# Patient Record
Sex: FEMALE | Race: OTHER | HISPANIC OR LATINO | URBAN - METROPOLITAN AREA
[De-identification: names, ages, dates, MRNs, and addresses within clinical notes are randomized per-mention and may not be internally consistent; named-entity substitution may affect disease eponyms.]

---

## 2020-10-15 ENCOUNTER — INPATIENT (INPATIENT)
Facility: HOSPITAL | Age: 50
LOS: 5 days | Discharge: HOME | DRG: 177 | End: 2020-10-21
Attending: INTERNAL MEDICINE | Admitting: INTERNAL MEDICINE
Payer: COMMERCIAL

## 2020-10-15 VITALS
HEART RATE: 92 BPM | DIASTOLIC BLOOD PRESSURE: 67 MMHG | WEIGHT: 179.9 LBS | SYSTOLIC BLOOD PRESSURE: 104 MMHG | OXYGEN SATURATION: 91 % | RESPIRATION RATE: 25 BRPM | TEMPERATURE: 99 F

## 2020-10-15 DIAGNOSIS — J18.9 PNEUMONIA, UNSPECIFIED ORGANISM: ICD-10-CM

## 2020-10-15 LAB
ALBUMIN SERPL ELPH-MCNC: 3.1 G/DL — LOW (ref 3.5–5)
ALP SERPL-CCNC: 62 U/L — SIGNIFICANT CHANGE UP (ref 40–120)
ALT FLD-CCNC: 26 U/L DA — SIGNIFICANT CHANGE UP (ref 10–60)
ANION GAP SERPL CALC-SCNC: 3 MMOL/L — LOW (ref 5–17)
AST SERPL-CCNC: 31 U/L — SIGNIFICANT CHANGE UP (ref 10–40)
BASOPHILS # BLD AUTO: 0.01 K/UL — SIGNIFICANT CHANGE UP (ref 0–0.2)
BASOPHILS NFR BLD AUTO: 0.2 % — SIGNIFICANT CHANGE UP (ref 0–2)
BILIRUB SERPL-MCNC: 0.3 MG/DL — SIGNIFICANT CHANGE UP (ref 0.2–1.2)
BUN SERPL-MCNC: 12 MG/DL — SIGNIFICANT CHANGE UP (ref 7–18)
CALCIUM SERPL-MCNC: 8.6 MG/DL — SIGNIFICANT CHANGE UP (ref 8.4–10.5)
CHLORIDE SERPL-SCNC: 104 MMOL/L — SIGNIFICANT CHANGE UP (ref 96–108)
CO2 SERPL-SCNC: 29 MMOL/L — SIGNIFICANT CHANGE UP (ref 22–31)
CREAT SERPL-MCNC: 0.91 MG/DL — SIGNIFICANT CHANGE UP (ref 0.5–1.3)
D DIMER BLD IA.RAPID-MCNC: 155 NG/ML DDU — SIGNIFICANT CHANGE UP
EOSINOPHIL # BLD AUTO: 0.1 K/UL — SIGNIFICANT CHANGE UP (ref 0–0.5)
EOSINOPHIL NFR BLD AUTO: 1.5 % — SIGNIFICANT CHANGE UP (ref 0–6)
GLUCOSE SERPL-MCNC: 104 MG/DL — HIGH (ref 70–99)
HCG SERPL-ACNC: 2 MIU/ML — SIGNIFICANT CHANGE UP
HCT VFR BLD CALC: 37.6 % — SIGNIFICANT CHANGE UP (ref 34.5–45)
HGB BLD-MCNC: 12.8 G/DL — SIGNIFICANT CHANGE UP (ref 11.5–15.5)
IMM GRANULOCYTES NFR BLD AUTO: 0.3 % — SIGNIFICANT CHANGE UP (ref 0–1.5)
LYMPHOCYTES # BLD AUTO: 1.27 K/UL — SIGNIFICANT CHANGE UP (ref 1–3.3)
LYMPHOCYTES # BLD AUTO: 19.4 % — SIGNIFICANT CHANGE UP (ref 13–44)
MCHC RBC-ENTMCNC: 27.6 PG — SIGNIFICANT CHANGE UP (ref 27–34)
MCHC RBC-ENTMCNC: 34 GM/DL — SIGNIFICANT CHANGE UP (ref 32–36)
MCV RBC AUTO: 81 FL — SIGNIFICANT CHANGE UP (ref 80–100)
MONOCYTES # BLD AUTO: 0.3 K/UL — SIGNIFICANT CHANGE UP (ref 0–0.9)
MONOCYTES NFR BLD AUTO: 4.6 % — SIGNIFICANT CHANGE UP (ref 2–14)
NEUTROPHILS # BLD AUTO: 4.85 K/UL — SIGNIFICANT CHANGE UP (ref 1.8–7.4)
NEUTROPHILS NFR BLD AUTO: 74 % — SIGNIFICANT CHANGE UP (ref 43–77)
NRBC # BLD: 0 /100 WBCS — SIGNIFICANT CHANGE UP (ref 0–0)
PLATELET # BLD AUTO: 167 K/UL — SIGNIFICANT CHANGE UP (ref 150–400)
POTASSIUM SERPL-MCNC: 4.7 MMOL/L — SIGNIFICANT CHANGE UP (ref 3.5–5.3)
POTASSIUM SERPL-SCNC: 4.7 MMOL/L — SIGNIFICANT CHANGE UP (ref 3.5–5.3)
PROT SERPL-MCNC: 7.8 G/DL — SIGNIFICANT CHANGE UP (ref 6–8.3)
RBC # BLD: 4.64 M/UL — SIGNIFICANT CHANGE UP (ref 3.8–5.2)
RBC # FLD: 14.1 % — SIGNIFICANT CHANGE UP (ref 10.3–14.5)
SARS-COV-2 RNA SPEC QL NAA+PROBE: DETECTED
SODIUM SERPL-SCNC: 136 MMOL/L — SIGNIFICANT CHANGE UP (ref 135–145)
TROPONIN I SERPL-MCNC: <0.015 NG/ML — SIGNIFICANT CHANGE UP (ref 0–0.04)
WBC # BLD: 6.55 K/UL — SIGNIFICANT CHANGE UP (ref 3.8–10.5)
WBC # FLD AUTO: 6.55 K/UL — SIGNIFICANT CHANGE UP (ref 3.8–10.5)

## 2020-10-15 PROCEDURE — 93010 ELECTROCARDIOGRAM REPORT: CPT

## 2020-10-15 PROCEDURE — 71045 X-RAY EXAM CHEST 1 VIEW: CPT | Mod: 26

## 2020-10-15 PROCEDURE — 99283 EMERGENCY DEPT VISIT LOW MDM: CPT

## 2020-10-15 PROCEDURE — 99222 1ST HOSP IP/OBS MODERATE 55: CPT

## 2020-10-15 NOTE — H&P ADULT - PROBLEM SELECTOR PLAN 2
- as above  will start Remdesivir (200 mg loading dose and c/w 100 mg dq for4 days) after ID approval    NPPV to maintain SaO2 >90% if necessary  -Isolation Precautions   - will consider starting dexamethasone 6mg qd for 10 days if sx progress

## 2020-10-15 NOTE — ED PROVIDER NOTE - CLINICAL SUMMARY MEDICAL DECISION MAKING FREE TEXT BOX
51 y/o woman, no significant PMH, c/o cough, fever, shortness of breath, decreased appetite, mild chest discomfort, high clinical suspicion of COVID and hypoxic upon arrival--CXR, labs, COVID swab, EKG, anticipate admission.

## 2020-10-15 NOTE — ED PROVIDER NOTE - PROGRESS NOTE DETAILS
High suspicion of COVID with swab still pending, but Pt with relative hypoxia (91% on RA), improved on nasal cannula 2 liters.  CXR with b/l infiltrates.  Dr. Sargent accepts admission.

## 2020-10-15 NOTE — ED ADULT NURSE NOTE - OBJECTIVE STATEMENT
pt is here c/o cough - greenish to yellow sputum , fevers of 100.4-100.8 @ nights since saturday - lying in stretcher hob elevated , placed on 100 nrb oxygen , pt is able to complete full sentences without a lot of distress

## 2020-10-15 NOTE — ED PROVIDER NOTE - OBJECTIVE STATEMENT
49 y/o woman, no PMH c/o chills x 5 days, then gradually develooped decreased appetite, and in past day fever to 100.8 F, shortness of breath, chest discomfort.  She went to her PMD Dr. Marie and she says she was given "penicillin shot" and then started on Z-micah and just completed it.  No h/o DVT/PE/heart disease/lung disease.  Never smoked.

## 2020-10-15 NOTE — H&P ADULT - ASSESSMENT
49yo F from home unemployed lives with her mom with no significant PMH presented with cough and SOB . Onset of symptoms was almost 14 days ago started with mild headache , chills associated with poor appetite. Admitted for PNA due to COVID-19.

## 2020-10-15 NOTE — ED PROVIDER NOTE - CARE PLAN
Principal Discharge DX:	Pneumonia of both lower lobes due to infectious organism  Secondary Diagnosis:	Hypoxia

## 2020-10-15 NOTE — H&P ADULT - PROBLEM SELECTOR PLAN 1
p/w SOB, cough, fever, RR:25, [id number] on RA 90%, saturated high 80s on walking , COVID- 19 PCR positive    Shortness of Breath due to COVID-19 Infection   - WBC: WNL, lymphocyte; WNl   - CXR : BL pulmonary infiltration   - Contact and airborne isolation precaution   - LDH, Procalcitonin, ferritin   - continue with oxygen supplementation   - Tylenol, Albuterol Inhaler, Robitussin PRN

## 2020-10-15 NOTE — H&P ADULT - ATTENDING COMMENTS
Patient is a 50 year old female with no significant PMH who presented with a chief complaint of cough.  Patient states that beginning approximately 14 days prior to presentation, she began to experience progressively worsening constitutional symptoms, involving a cough, subjective fever/chills, and decreased appetite.  As a result, approximately 1 week prior to presentation, she was seen by her PCP who prescribed an antibiotic (possibly Zithromax), which patient endorses completion of antibiotic course.    Patient states that she seldom leaves her house due to being currently unemployed, denies any sick contacts, though patient's mother is presenting with similar (though significantly less severe symptoms).    T(C): 37 (10-15-20 @ 18:23), Max: 37 (10-15-20 @ 18:23)  T(F): 98.6 (10-15-20 @ 18:23), Max: 98.6 (10-15-20 @ 18:23)  HR: 92 (10-15-20 @ 18:23) (92 - 92)  BP: 104/67 (10-15-20 @ 18:23) (104/67 - 104/67)  RR: 25 (10-15-20 @ 18:23) (25 - 25)  SpO2: 97% (10-15-20 @ 22:00) (91% - 97%)  Wt(kg): --    P/E: As above MAR    A/P:    Shortness of Breath due to COVID-19 Infection:  -At time of examination, patient clinically appears in moderate distress though without hypoxia and/or requiring supplemental oxygen (Nonsevere Disease)  -Therefore, will start patient on Remdesivir 200mg as a loading dose (Day 1), followed by 100mg once daily for 4 days or until hospital discharge, whichever is first  -Will continue to use NPPV to maintain SaO2 >90%  -If patient develops worsening clinically symptoms (Severe Disease), develops hypoxia and/or requires oxygen, will consider starting patient on dexamethasone 6mg daily for 10 days or until discharge, whichever is shorter  -Will start patient on therapeutic Lovenox  -Patient should be evaluated by Infectious Disease (ID)  -Isolation Precautions  -Will continue to use NPPV to maintain SaO2 >90%    Hypoalbuminemia:  -Nutrition Consult    GI/DVT PPx:  -Pepcid  -Lovenox Patient is a 50 year old female with no significant PMH who presented with a chief complaint of cough.  Patient states that beginning approximately 14 days prior to presentation, she began to experience progressively worsening constitutional symptoms, involving a cough, subjective fever/chills, and decreased appetite.  As a result, approximately 1 week prior to presentation, she was seen by her PCP who prescribed an antibiotic (possibly Zithromax), which patient endorses completion of antibiotic course.    Patient states that she seldom leaves her house due to being currently unemployed, denies any sick contacts, though patient's mother is presenting with similar (though significantly less severe symptoms).    At time of examination, patient required no supplemental oxygen to maintain adequate SaO2 and able to speak in nearly complete sentences, though was frequently interrupted from a harsh, non-productive cough    T(C): 37 (10-15-20 @ 18:23), Max: 37 (10-15-20 @ 18:23)  T(F): 98.6 (10-15-20 @ 18:23), Max: 98.6 (10-15-20 @ 18:23)  HR: 92 (10-15-20 @ 18:23) (92 - 92)  BP: 104/67 (10-15-20 @ 18:23) (104/67 - 104/67)  RR: 25 (10-15-20 @ 18:23) (25 - 25)  SpO2: 97% (10-15-20 @ 22:00) (91% - 97%)  Wt(kg): --    P/E: As above MAR    A/P:    Shortness of Breath due to COVID-19 Infection:  -At time of examination, patient clinically appears in moderate distress though without hypoxia and/or requiring supplemental oxygen (Nonsevere Disease)  -Therefore, will start patient on Remdesivir 200mg as a loading dose (Day 1), followed by 100mg once daily for 4 days or until hospital discharge, whichever is first  -Will continue to use NPPV to maintain SaO2 >90%  -If patient develops worsening clinically symptoms (Severe Disease), develops hypoxia and/or requires oxygen, will consider starting patient on dexamethasone 6mg daily for 10 days or until discharge, whichever is shorter  -Will start patient on therapeutic Lovenox  -Patient should be evaluated by Infectious Disease (ID)  -Isolation Precautions  -Will continue to use NPPV to maintain SaO2 >90%    Hypoalbuminemia:  -Nutrition Consult    GI/DVT PPx:  -Pepcid  -Lovenox Patient is a 50 year old female with no significant PMH who presented with a chief complaint of cough.  Patient states that beginning approximately 14 days prior to presentation, she began to experience progressively worsening constitutional symptoms, involving a cough, subjective fever/chills, and decreased appetite.  As a result, approximately 1 week prior to presentation, she was seen by her PCP who prescribed an antibiotic (possibly Zithromax), which patient endorses completion of antibiotic course.    Patient states that she seldom leaves her house due to being currently unemployed, denies any sick contacts, though patient's mother is presenting with similar (though significantly less severe symptoms).    At time of examination, patient required no supplemental oxygen to maintain adequate SaO2 and able to speak in nearly complete sentences, though was frequently interrupted from a harsh, non-productive cough    T(C): 37 (10-15-20 @ 18:23), Max: 37 (10-15-20 @ 18:23)  T(F): 98.6 (10-15-20 @ 18:23), Max: 98.6 (10-15-20 @ 18:23)  HR: 92 (10-15-20 @ 18:23) (92 - 92)  BP: 104/67 (10-15-20 @ 18:23) (104/67 - 104/67)  RR: 25 (10-15-20 @ 18:23) (25 - 25)  SpO2: 97% (10-15-20 @ 22:00) (91% - 97%)  Wt(kg): --    P/E: As above MAR    A/P:    Shortness of Breath due to COVID-19 Infection:  -At time of examination, patient clinically appears in moderate distress though without hypoxia and/or requiring supplemental oxygen (Nonsevere Disease)  -Therefore, will start patient on Remdesivir 200mg as a loading dose (Day 1), followed by 100mg once daily for 4 days or until hospital discharge, whichever is first  -Should use NPPV to maintain SaO2 >90% if necessary  -If patient develops worsening clinically symptoms (Severe Disease), develops hypoxia and/or requires oxygen, will consider starting patient on dexamethasone 6mg daily for 10 days or until discharge, whichever is shorter  -Will start patient on therapeutic Lovenox  -Patient should be evaluated by Infectious Disease (ID)  -Isolation Precautions  -Will continue to use NPPV to maintain SaO2 >90%    Hypoalbuminemia:  -Nutrition Consult    GI/DVT PPx:  -Pepcid  -Lovenox

## 2020-10-15 NOTE — ED PROVIDER NOTE - RESPIRATORY, MLM
Breath sounds clear and equal bilaterally.  Pt is short of breath with speaking but able to speak in full sentences.

## 2020-10-15 NOTE — H&P ADULT - NSHPPHYSICALEXAM_GEN_ALL_CORE
CONSTITUTIONAL: in mild respiratory distress  ENMT: Airway patent, Nasal mucosa clear. Mouth with normal mucosa. Throat has no vesicles, no oropharyngeal exudates and uvula is midline.  EYES: Clear bilaterally, pupils equal, round and reactive to light. EOMI.  CARDIAC: Normal rate, regular rhythm.  Heart sounds S1, S2.  No murmurs, rubs or gallops   RESPIRATORY: Breath sounds clear and equal bilaterally. No wheezes, rhales or rhonchi  MUSCULOSKELETAL: Spine appears normal, range of motion is not limited, no muscle or joint tenderness  EXTREMITIES: No edema, cyanosis or deformity   NEUROLOGICAL: Alert and oriented, no focal deficits, no motor or sensory deficits.  SKIN: No rash, skin turgor    Abdomen : NT , Soft , ND

## 2020-10-16 DIAGNOSIS — Z29.9 ENCOUNTER FOR PROPHYLACTIC MEASURES, UNSPECIFIED: ICD-10-CM

## 2020-10-16 DIAGNOSIS — U07.1 COVID-19: ICD-10-CM

## 2020-10-16 DIAGNOSIS — R06.03 ACUTE RESPIRATORY DISTRESS: ICD-10-CM

## 2020-10-16 LAB
ALBUMIN SERPL ELPH-MCNC: 3.7 G/DL — SIGNIFICANT CHANGE UP (ref 3.3–5)
ALP SERPL-CCNC: 57 U/L — SIGNIFICANT CHANGE UP (ref 40–120)
ALT FLD-CCNC: 19 U/L — SIGNIFICANT CHANGE UP (ref 10–45)
AST SERPL-CCNC: 28 U/L — SIGNIFICANT CHANGE UP (ref 10–40)
BILIRUB DIRECT SERPL-MCNC: 0.1 MG/DL — SIGNIFICANT CHANGE UP (ref 0–0.2)
BILIRUB INDIRECT FLD-MCNC: 0.1 MG/DL — LOW (ref 0.2–1.2)
BILIRUB SERPL-MCNC: 0.2 MG/DL — SIGNIFICANT CHANGE UP (ref 0.2–1.2)
CREAT SERPL-MCNC: 0.86 MG/DL — SIGNIFICANT CHANGE UP (ref 0.5–1.3)
CRP SERPL-MCNC: 3.69 MG/DL — HIGH (ref 0–0.4)
FERRITIN SERPL-MCNC: 413 NG/ML — HIGH (ref 15–150)
IGA FLD-MCNC: 251 MG/DL — SIGNIFICANT CHANGE UP (ref 84–499)
IGG FLD-MCNC: 1231 MG/DL — SIGNIFICANT CHANGE UP (ref 610–1660)
IGM SERPL-MCNC: 60 MG/DL — SIGNIFICANT CHANGE UP (ref 35–242)
KAPPA LC SER QL IFE: 3.28 MG/DL — HIGH (ref 0.33–1.94)
KAPPA/LAMBDA FREE LIGHT CHAIN RATIO, SERUM: 1.08 RATIO — SIGNIFICANT CHANGE UP (ref 0.26–1.65)
LAMBDA LC SER QL IFE: 3.03 MG/DL — HIGH (ref 0.57–2.63)
LDH SERPL L TO P-CCNC: 296 U/L — HIGH (ref 120–225)
NT-PROBNP SERPL-SCNC: 5 PG/ML — SIGNIFICANT CHANGE UP (ref 0–125)
PROT SERPL-MCNC: 6.6 G/DL — SIGNIFICANT CHANGE UP (ref 6–8.3)
SARS-COV-2 IGG SERPL QL IA: NEGATIVE — SIGNIFICANT CHANGE UP
SARS-COV-2 IGM SERPL IA-ACNC: 4.23 AU/ML — SIGNIFICANT CHANGE UP

## 2020-10-16 PROCEDURE — 99233 SBSQ HOSP IP/OBS HIGH 50: CPT | Mod: GC

## 2020-10-16 RX ORDER — ENOXAPARIN SODIUM 100 MG/ML
40 INJECTION SUBCUTANEOUS DAILY
Refills: 0 | Status: DISCONTINUED | OUTPATIENT
Start: 2020-10-16 | End: 2020-10-21

## 2020-10-16 RX ORDER — PANTOPRAZOLE SODIUM 20 MG/1
40 TABLET, DELAYED RELEASE ORAL
Refills: 0 | Status: DISCONTINUED | OUTPATIENT
Start: 2020-10-16 | End: 2020-10-21

## 2020-10-16 RX ORDER — ALBUTEROL 90 UG/1
2 AEROSOL, METERED ORAL EVERY 6 HOURS
Refills: 0 | Status: COMPLETED | OUTPATIENT
Start: 2020-10-16 | End: 2020-10-16

## 2020-10-16 RX ORDER — SODIUM CHLORIDE 9 MG/ML
500 INJECTION INTRAMUSCULAR; INTRAVENOUS; SUBCUTANEOUS ONCE
Refills: 0 | Status: COMPLETED | OUTPATIENT
Start: 2020-10-16 | End: 2020-10-16

## 2020-10-16 RX ORDER — REMDESIVIR 5 MG/ML
200 INJECTION INTRAVENOUS EVERY 24 HOURS
Refills: 0 | Status: COMPLETED | OUTPATIENT
Start: 2020-10-16 | End: 2020-10-16

## 2020-10-16 RX ORDER — REMDESIVIR 5 MG/ML
INJECTION INTRAVENOUS
Refills: 0 | Status: COMPLETED | OUTPATIENT
Start: 2020-10-16 | End: 2020-10-20

## 2020-10-16 RX ORDER — REMDESIVIR 5 MG/ML
100 INJECTION INTRAVENOUS EVERY 24 HOURS
Refills: 0 | Status: COMPLETED | OUTPATIENT
Start: 2020-10-17 | End: 2020-10-20

## 2020-10-16 RX ORDER — ACETAMINOPHEN 500 MG
650 TABLET ORAL EVERY 6 HOURS
Refills: 0 | Status: DISCONTINUED | OUTPATIENT
Start: 2020-10-16 | End: 2020-10-21

## 2020-10-16 RX ORDER — DEXAMETHASONE 0.5 MG/5ML
6 ELIXIR ORAL DAILY
Refills: 0 | Status: DISCONTINUED | OUTPATIENT
Start: 2020-10-16 | End: 2020-10-21

## 2020-10-16 RX ADMIN — ALBUTEROL 2 PUFF(S): 90 AEROSOL, METERED ORAL at 05:10

## 2020-10-16 RX ADMIN — Medication 650 MILLIGRAM(S): at 02:23

## 2020-10-16 RX ADMIN — ENOXAPARIN SODIUM 40 MILLIGRAM(S): 100 INJECTION SUBCUTANEOUS at 12:50

## 2020-10-16 RX ADMIN — SODIUM CHLORIDE 500 MILLILITER(S): 9 INJECTION INTRAMUSCULAR; INTRAVENOUS; SUBCUTANEOUS at 08:18

## 2020-10-16 RX ADMIN — Medication 650 MILLIGRAM(S): at 01:30

## 2020-10-16 RX ADMIN — PANTOPRAZOLE SODIUM 40 MILLIGRAM(S): 20 TABLET, DELAYED RELEASE ORAL at 05:12

## 2020-10-16 RX ADMIN — REMDESIVIR 500 MILLIGRAM(S): 5 INJECTION INTRAVENOUS at 14:57

## 2020-10-16 RX ADMIN — Medication 6 MILLIGRAM(S): at 12:50

## 2020-10-16 NOTE — PROGRESS NOTE ADULT - SUBJECTIVE AND OBJECTIVE BOX
PGY-1 Progress Note discussed with attending    PAGER #: [-----] TILL 5:00 PM  PLEASE CONTACT ON CALL TEAM:  - On Call Team (Please refer to [place]) FROM 5:00 PM - 8:30PM  - Nightfloat Team FROM 8:30 -7:30 AM    CHIEF COMPLAINT & BRIEF HOSPITAL COURSE:  51yo F from home unemployed lives with her mom with no significant PMH presented with cough and SOB . Onset of symptoms was almost 14 days ago started with mild headache , chills associated with poor appetite.  patient reports symptoms were progressive with worsening cough , sob for which patient visited PCP who prescribed Z pack and patient completed the course . Patient started to spike fever to Max 100.8 over night , with chest discomfort. patient endorses loose BM , poor oral intake due to poor appetite, denies N/V. Patient is unemployed and barely leaves her house , denies sick contact however mother presented to ED with similiar Sx although much milder. Patient reports symports started with her and mother afterwards.     INTERVAL HPI/OVERNIGHT EVENTS: Patient is running low blood pressure. but MAP is ok. will observe the patient through all the day.     MEDICATIONS:  acetaminophen   Tablet .. 650 milliGRAM(s) Oral every 6 hours PRN  dexAMETHasone  Injectable 6 milliGRAM(s) IV Push daily  enoxaparin Injectable 40 milliGRAM(s) SubCutaneous daily  pantoprazole    Tablet 40 milliGRAM(s) Oral before breakfast  remdesivir  IVPB   IV Intermittent       REVIEW OF SYSTEMS:  CONSTITUTIONAL: No fever, weight loss, or fatigue  RESPIRATORY: No cough, wheezing, chills or hemoptysis; No shortness of breath  CARDIOVASCULAR: No chest pain, palpitations, dizziness, or leg swelling  GASTROINTESTINAL: No abdominal pain. No nausea, vomiting, or hematemesis; No diarrhea or constipation. No melena or hematochezia.  GENITOURINARY: No dysuria or hematuria, urinary frequency  NEUROLOGICAL: No headaches, memory loss, loss of strength, numbness, or tremors  SKIN: No itching, burning, rashes, or lesions     Vital Signs Last 24 Hrs  T(C): 37.9 (16 Oct 2020 14:57), Max: 38.9 (16 Oct 2020 01:57)  T(F): 100.2 (16 Oct 2020 14:57), Max: 102.1 (16 Oct 2020 01:57)  HR: 93 (16 Oct 2020 14:57) (77 - 95)  BP: 96/72 (16 Oct 2020 14:57) (95/47 - 104/67)  BP(mean): --  RR: 18 (16 Oct 2020 14:57) (17 - 25)  SpO2: 100% (16 Oct 2020 14:57) (91% - 100%)    PHYSICAL EXAMINATION:  CONSTITUTIONAL: in mild respiratory distress  ENMT: Airway patent, Nasal mucosa clear. Mouth with normal mucosa. Throat has no vesicles, no oropharyngeal exudates and uvula is midline.  EYES: Clear bilaterally, pupils equal, round and reactive to light. EOMI.  CARDIAC: Normal rate, regular rhythm.  Heart sounds S1, S2.  No murmurs, rubs or gallops   RESPIRATORY: Breath sounds clear and equal bilaterally. No wheezes, rhales or rhonchi  MUSCULOSKELETAL: Spine appears normal, range of motion is not limited, no muscle or joint tenderness  EXTREMITIES: No edema, cyanosis or deformity   NEUROLOGICAL: Alert and oriented, no focal deficits, no motor or sensory deficits.  SKIN: No rash, skin turgor   Abdomen : NT , Soft , ND                        12.8   6.55  )-----------( 167      ( 15 Oct 2020 19:48 )             37.6     10-15    136  |  104  |  12  ----------------------------<  104<H>  4.7   |  29  |  0.91    Ca    8.6      15 Oct 2020 19:48    TPro  7.8  /  Alb  3.1<L>  /  TBili  0.3  /  DBili  x   /  AST  31  /  ALT  26  /  AlkPhos  62  10-15    LIVER FUNCTIONS - ( 15 Oct 2020 19:48 )  Alb: 3.1 g/dL / Pro: 7.8 g/dL / ALK PHOS: 62 U/L / ALT: 26 U/L DA / AST: 31 U/L / GGT: x           CARDIAC MARKERS ( 15 Oct 2020 19:48 )  <0.015 ng/mL / x     / x     / x     / x              CAPILLARY BLOOD GLUCOSE      RADIOLOGY & ADDITIONAL TESTS:

## 2020-10-16 NOTE — PROGRESS NOTE ADULT - PROBLEM SELECTOR PLAN 1
-  p/w SOB, cough, fever, RR:25, [id number] on RA 90%, saturated high 80s on    walking , COVID- 19 PCR positive  - COVID-19 antibodies is negative.   -  Shortness of Breath due to COVID-19 Infection   -  WBC: WNL, lymphocyte; WNl   -  CXR : BL pulmonary infiltration   - Contact and airborne isolation precaution   - LDH, Procalcitonin, ferritin EVERY 2 DAYS   - continue with oxygen supplementation   - Tylenol, Albuterol Inhaler, Robitussin PRN

## 2020-10-16 NOTE — PROGRESS NOTE ADULT - PROBLEM SELECTOR PLAN 2
- as above\  -  Remdesivir was started and will follow up LFTs every day.   -  Isolation Precautions  -  will consider starting dexamethasone 6mg qd for 10 days if sx progress -  as above  -  Remdesivir was started and will follow up LFTs every day.   -  Isolation Precautions  -  will consider starting dexamethasone 6mg qd for 10 days if sx progress

## 2020-10-17 LAB
A1C WITH ESTIMATED AVERAGE GLUCOSE RESULT: 6.2 % — HIGH (ref 4–5.6)
ALBUMIN SERPL ELPH-MCNC: 2.6 G/DL — LOW (ref 3.5–5)
ALBUMIN SERPL ELPH-MCNC: 2.7 G/DL — LOW (ref 3.5–5)
ALP SERPL-CCNC: 54 U/L — SIGNIFICANT CHANGE UP (ref 40–120)
ALP SERPL-CCNC: 55 U/L — SIGNIFICANT CHANGE UP (ref 40–120)
ALT FLD-CCNC: 24 U/L DA — SIGNIFICANT CHANGE UP (ref 10–60)
ALT FLD-CCNC: 25 U/L DA — SIGNIFICANT CHANGE UP (ref 10–60)
ANION GAP SERPL CALC-SCNC: 6 MMOL/L — SIGNIFICANT CHANGE UP (ref 5–17)
AST SERPL-CCNC: 29 U/L — SIGNIFICANT CHANGE UP (ref 10–40)
AST SERPL-CCNC: 32 U/L — SIGNIFICANT CHANGE UP (ref 10–40)
BASOPHILS # BLD AUTO: 0.01 K/UL — SIGNIFICANT CHANGE UP (ref 0–0.2)
BASOPHILS NFR BLD AUTO: 0.1 % — SIGNIFICANT CHANGE UP (ref 0–2)
BILIRUB DIRECT SERPL-MCNC: <0.1 MG/DL — SIGNIFICANT CHANGE UP (ref 0–0.2)
BILIRUB INDIRECT FLD-MCNC: >0.1 MG/DL — LOW (ref 0.2–1)
BILIRUB SERPL-MCNC: 0.2 MG/DL — SIGNIFICANT CHANGE UP (ref 0.2–1.2)
BILIRUB SERPL-MCNC: 0.2 MG/DL — SIGNIFICANT CHANGE UP (ref 0.2–1.2)
BUN SERPL-MCNC: 16 MG/DL — SIGNIFICANT CHANGE UP (ref 7–18)
CALCIUM SERPL-MCNC: 8.5 MG/DL — SIGNIFICANT CHANGE UP (ref 8.4–10.5)
CHLORIDE SERPL-SCNC: 108 MMOL/L — SIGNIFICANT CHANGE UP (ref 96–108)
CHOLEST SERPL-MCNC: 122 MG/DL — SIGNIFICANT CHANGE UP (ref 10–199)
CO2 SERPL-SCNC: 27 MMOL/L — SIGNIFICANT CHANGE UP (ref 22–31)
CREAT SERPL-MCNC: 0.62 MG/DL — SIGNIFICANT CHANGE UP (ref 0.5–1.3)
CRP SERPL-MCNC: 7.4 MG/DL — HIGH (ref 0–0.4)
EOSINOPHIL # BLD AUTO: 0 K/UL — SIGNIFICANT CHANGE UP (ref 0–0.5)
EOSINOPHIL NFR BLD AUTO: 0 % — SIGNIFICANT CHANGE UP (ref 0–6)
ESTIMATED AVERAGE GLUCOSE: 131 MG/DL — HIGH (ref 68–114)
FERRITIN SERPL-MCNC: 501 NG/ML — HIGH (ref 15–150)
GLUCOSE SERPL-MCNC: 113 MG/DL — HIGH (ref 70–99)
HCT VFR BLD CALC: 35.5 % — SIGNIFICANT CHANGE UP (ref 34.5–45)
HDLC SERPL-MCNC: 57 MG/DL — SIGNIFICANT CHANGE UP
HGB BLD-MCNC: 12.1 G/DL — SIGNIFICANT CHANGE UP (ref 11.5–15.5)
IMM GRANULOCYTES NFR BLD AUTO: 0.6 % — SIGNIFICANT CHANGE UP (ref 0–1.5)
LDH SERPL L TO P-CCNC: 295 U/L — HIGH (ref 120–225)
LIPID PNL WITH DIRECT LDL SERPL: 54 MG/DL — SIGNIFICANT CHANGE UP
LYMPHOCYTES # BLD AUTO: 1.13 K/UL — SIGNIFICANT CHANGE UP (ref 1–3.3)
LYMPHOCYTES # BLD AUTO: 10.3 % — LOW (ref 13–44)
MCHC RBC-ENTMCNC: 27.7 PG — SIGNIFICANT CHANGE UP (ref 27–34)
MCHC RBC-ENTMCNC: 34.1 GM/DL — SIGNIFICANT CHANGE UP (ref 32–36)
MCV RBC AUTO: 81.2 FL — SIGNIFICANT CHANGE UP (ref 80–100)
MONOCYTES # BLD AUTO: 0.38 K/UL — SIGNIFICANT CHANGE UP (ref 0–0.9)
MONOCYTES NFR BLD AUTO: 3.5 % — SIGNIFICANT CHANGE UP (ref 2–14)
NEUTROPHILS # BLD AUTO: 9.34 K/UL — HIGH (ref 1.8–7.4)
NEUTROPHILS NFR BLD AUTO: 85.5 % — HIGH (ref 43–77)
NRBC # BLD: 0 /100 WBCS — SIGNIFICANT CHANGE UP (ref 0–0)
PLATELET # BLD AUTO: 205 K/UL — SIGNIFICANT CHANGE UP (ref 150–400)
POTASSIUM SERPL-MCNC: 4.4 MMOL/L — SIGNIFICANT CHANGE UP (ref 3.5–5.3)
POTASSIUM SERPL-SCNC: 4.4 MMOL/L — SIGNIFICANT CHANGE UP (ref 3.5–5.3)
PROT SERPL-MCNC: 7.3 G/DL — SIGNIFICANT CHANGE UP (ref 6–8.3)
PROT SERPL-MCNC: 7.4 G/DL — SIGNIFICANT CHANGE UP (ref 6–8.3)
RBC # BLD: 4.37 M/UL — SIGNIFICANT CHANGE UP (ref 3.8–5.2)
RBC # FLD: 14 % — SIGNIFICANT CHANGE UP (ref 10.3–14.5)
SODIUM SERPL-SCNC: 141 MMOL/L — SIGNIFICANT CHANGE UP (ref 135–145)
TOTAL CHOLESTEROL/HDL RATIO MEASUREMENT: 2.1 RATIO — LOW (ref 3.3–7.1)
TRIGL SERPL-MCNC: 55 MG/DL — SIGNIFICANT CHANGE UP (ref 10–149)
TSH SERPL-MCNC: 0.62 UU/ML — SIGNIFICANT CHANGE UP (ref 0.34–4.82)
WBC # BLD: 10.93 K/UL — HIGH (ref 3.8–10.5)
WBC # FLD AUTO: 10.93 K/UL — HIGH (ref 3.8–10.5)

## 2020-10-17 PROCEDURE — 99233 SBSQ HOSP IP/OBS HIGH 50: CPT | Mod: GC

## 2020-10-17 RX ADMIN — Medication 6 MILLIGRAM(S): at 05:24

## 2020-10-17 RX ADMIN — REMDESIVIR 500 MILLIGRAM(S): 5 INJECTION INTRAVENOUS at 14:59

## 2020-10-17 RX ADMIN — PANTOPRAZOLE SODIUM 40 MILLIGRAM(S): 20 TABLET, DELAYED RELEASE ORAL at 05:24

## 2020-10-17 RX ADMIN — ENOXAPARIN SODIUM 40 MILLIGRAM(S): 100 INJECTION SUBCUTANEOUS at 11:24

## 2020-10-17 NOTE — CHART NOTE - NSCHARTNOTEFT_GEN_A_CORE
Patient is a 50y old  Female who presents with a chief complaint of PNA due to Covid (16 Oct 2020 15:00)      INTERVAL HPI/OVERNIGHT EVENTS:  Pt was examined by the bedside. Pt is saturating 99% with NC2L on sitting at 9:40am. Pt states she felt SOB when she went to the bathroom and rush back to her bed to put oxygen on.  On lung exam, pt couldn't take a deep breath as whenever pt tries to take a deep breath, pt has cough. Pt denied headache, calf pain, or chills. Pt states she had loose stool.       REVIEW OF SYSTEMS:  CONSTITUTIONAL: No fever  NECK: No pain or stiffness  RESPIRATORY: (+) cough, no wheezing, chills, (+) shortness of breath with walking  CARDIOVASCULAR: No chest pain,  leg swelling  GASTROINTESTINAL: No abdominal pain.  (+) loose stool.  NEUROLOGICAL: No headaches  SKIN: No itching, burning, rashes, or lesions   MUSCULOSKELETAL: No joint pain or swelling;      FAMILY HISTORY:    Vital Signs Last 24 Hrs  T(C): 37.2 (17 Oct 2020 06:33), Max: 37.9 (16 Oct 2020 14:57)  T(F): 98.9 (17 Oct 2020 06:33), Max: 100.2 (16 Oct 2020 14:57)  HR: 74 (17 Oct 2020 06:33) (74 - 93)  BP: 112/48 (17 Oct 2020 06:33) (96/72 - 117/52)  BP(mean): --  RR: 18 (17 Oct 2020 06:33) (18 - 18)  SpO2: 100% (17 Oct 2020 06:33) (100% - 100%)      PHYSICAL EXAM:  GENERAL: Anxious, well-groomed, well-developed  HEAD:  Atraumatic, Normocephalic  EYES:  conjunctiva and sclera clear  NERVOUS SYSTEM:  Alert & Oriented X3, Good concentration; Moving all extremities  CHEST/LUNG: Cannot listen fully as pt has cough whenever she tries to take a deep breath  HEART: Regular rate and rhythm; No murmurs, rubs, or gallops  ABDOMEN: Soft, Nontender, Nondistended; Bowel sounds present  EXTREMITIES:  No edema      Consultant(s) Notes Reviewed:  [x ] YES  [ ] NO  Care Discussed with Consultants/Other Providers [ x] YES  [ ] NO    LABS:        RADIOLOGY & ADDITIONAL TESTS:    Imaging Personally Reviewed:  [ ] YES  [ ] NO  acetaminophen   Tablet .. 650 milliGRAM(s) Oral every 6 hours PRN  dexAMETHasone  Injectable 6 milliGRAM(s) IV Push daily  enoxaparin Injectable 40 milliGRAM(s) SubCutaneous daily  pantoprazole    Tablet 40 milliGRAM(s) Oral before breakfast  remdesivir  IVPB   IV Intermittent   remdesivir  IVPB 100 milliGRAM(s) IV Intermittent every 24 hours      HEALTH ISSUES - PROBLEM Dx:  Prophylactic measure  Prophylactic measure    Pneumonia due to COVID-19 virus  Pneumonia due to COVID-19 virus    Respiratory distress  Respiratory distress

## 2020-10-17 NOTE — PROGRESS NOTE ADULT - SUBJECTIVE AND OBJECTIVE BOX
Patient is a 50y old  Female who presents with a chief complaint of PNA due to Covid (16 Oct 2020 15:00)    Patient was seen and examined at bedside   SOB and cough has improved     INTERVAL HPI/OVERNIGHT EVENTS:  T(C): 37.1 (10-17-20 @ 14:38), Max: 37.3 (10-16-20 @ 19:15)  HR: 73 (10-17-20 @ 14:38) (73 - 79)  BP: 105/48 (10-17-20 @ 14:38) (105/48 - 117/52)  RR: 20 (10-17-20 @ 14:38) (18 - 20)  SpO2: 96% (10-17-20 @ 14:38) (96% - 100%)  Wt(kg): --  I&O's Summary      REVIEW OF SYSTEMS: denies fever, chills, chest pain, abdominal pain, nausea, vomiting diarrhea, constipation, dizziness    MEDICATIONS  (STANDING):  dexAMETHasone  Injectable 6 milliGRAM(s) IV Push daily  enoxaparin Injectable 40 milliGRAM(s) SubCutaneous daily  pantoprazole    Tablet 40 milliGRAM(s) Oral before breakfast  remdesivir  IVPB   IV Intermittent   remdesivir  IVPB 100 milliGRAM(s) IV Intermittent every 24 hours    MEDICATIONS  (PRN):  acetaminophen   Tablet .. 650 milliGRAM(s) Oral every 6 hours PRN Temp greater or equal to 38C (100.4F), Mild Pain (1 - 3)      PHYSICAL EXAM:  GENERAL: obese, comfortable with 2L NC  NERVOUS SYSTEM:  Alert & Oriented X3, Good concentration; Motor Strength 5/5 B/L upper and lower extremities  CHEST/LUNG: BL coarse breath sound with scattered crepitation  HEART: Regular rate and rhythm; No murmurs, rubs, or gallops  ABDOMEN: Soft, Nontender, Nondistended; Bowel sounds present  EXTREMITIES:  2+ Peripheral Pulses, No clubbing, cyanosis, or edema  SKIN: No rashes or lesions    LABS:                        12.1   10.93 )-----------( 205      ( 17 Oct 2020 07:26 )             35.5     141  |  108  |  16  ----------------------------<  113<H>  4.4   |  27  |  0.62    Ca    8.5      17 Oct 2020 07:26    TPro  7.4  /  Alb  2.6<L>  /  TBili  0.2  /  DBili  <0.1  /  AST  32  /  ALT  24  /  AlkPhos  55  10-17        CAPILLARY BLOOD GLUCOSE

## 2020-10-18 LAB
24R-OH-CALCIDIOL SERPL-MCNC: 26.4 NG/ML — LOW (ref 30–80)
ALBUMIN SERPL ELPH-MCNC: 2.7 G/DL — LOW (ref 3.5–5)
ALP SERPL-CCNC: 53 U/L — SIGNIFICANT CHANGE UP (ref 40–120)
ALT FLD-CCNC: 24 U/L DA — SIGNIFICANT CHANGE UP (ref 10–60)
ANION GAP SERPL CALC-SCNC: 5 MMOL/L — SIGNIFICANT CHANGE UP (ref 5–17)
AST SERPL-CCNC: 38 U/L — SIGNIFICANT CHANGE UP (ref 10–40)
BASOPHILS # BLD AUTO: 0.01 K/UL — SIGNIFICANT CHANGE UP (ref 0–0.2)
BASOPHILS NFR BLD AUTO: 0.1 % — SIGNIFICANT CHANGE UP (ref 0–2)
BILIRUB SERPL-MCNC: 0.3 MG/DL — SIGNIFICANT CHANGE UP (ref 0.2–1.2)
BUN SERPL-MCNC: 24 MG/DL — HIGH (ref 7–18)
CALCIUM SERPL-MCNC: 8.9 MG/DL — SIGNIFICANT CHANGE UP (ref 8.4–10.5)
CHLORIDE SERPL-SCNC: 107 MMOL/L — SIGNIFICANT CHANGE UP (ref 96–108)
CO2 SERPL-SCNC: 27 MMOL/L — SIGNIFICANT CHANGE UP (ref 22–31)
CREAT SERPL-MCNC: 0.69 MG/DL — SIGNIFICANT CHANGE UP (ref 0.5–1.3)
CREAT SERPL-MCNC: 0.69 MG/DL — SIGNIFICANT CHANGE UP (ref 0.5–1.3)
CRP SERPL-MCNC: 4.74 MG/DL — HIGH (ref 0–0.4)
EOSINOPHIL # BLD AUTO: 0 K/UL — SIGNIFICANT CHANGE UP (ref 0–0.5)
EOSINOPHIL NFR BLD AUTO: 0 % — SIGNIFICANT CHANGE UP (ref 0–6)
ERYTHROCYTE [SEDIMENTATION RATE] IN BLOOD: 51 MM/HR — HIGH (ref 0–15)
FERRITIN SERPL-MCNC: 646 NG/ML — HIGH (ref 15–150)
GLUCOSE SERPL-MCNC: 117 MG/DL — HIGH (ref 70–99)
HCT VFR BLD CALC: 36.6 % — SIGNIFICANT CHANGE UP (ref 34.5–45)
HGB BLD-MCNC: 12.4 G/DL — SIGNIFICANT CHANGE UP (ref 11.5–15.5)
IMM GRANULOCYTES NFR BLD AUTO: 1 % — SIGNIFICANT CHANGE UP (ref 0–1.5)
LDH SERPL L TO P-CCNC: 475 U/L — HIGH (ref 120–225)
LYMPHOCYTES # BLD AUTO: 1.65 K/UL — SIGNIFICANT CHANGE UP (ref 1–3.3)
LYMPHOCYTES # BLD AUTO: 20 % — SIGNIFICANT CHANGE UP (ref 13–44)
MCHC RBC-ENTMCNC: 27.6 PG — SIGNIFICANT CHANGE UP (ref 27–34)
MCHC RBC-ENTMCNC: 33.9 GM/DL — SIGNIFICANT CHANGE UP (ref 32–36)
MCV RBC AUTO: 81.5 FL — SIGNIFICANT CHANGE UP (ref 80–100)
MONOCYTES # BLD AUTO: 0.53 K/UL — SIGNIFICANT CHANGE UP (ref 0–0.9)
MONOCYTES NFR BLD AUTO: 6.4 % — SIGNIFICANT CHANGE UP (ref 2–14)
NEUTROPHILS # BLD AUTO: 5.98 K/UL — SIGNIFICANT CHANGE UP (ref 1.8–7.4)
NEUTROPHILS NFR BLD AUTO: 72.5 % — SIGNIFICANT CHANGE UP (ref 43–77)
NRBC # BLD: 0 /100 WBCS — SIGNIFICANT CHANGE UP (ref 0–0)
PLATELET # BLD AUTO: 283 K/UL — SIGNIFICANT CHANGE UP (ref 150–400)
POTASSIUM SERPL-MCNC: 4.9 MMOL/L — SIGNIFICANT CHANGE UP (ref 3.5–5.3)
POTASSIUM SERPL-SCNC: 4.9 MMOL/L — SIGNIFICANT CHANGE UP (ref 3.5–5.3)
PROCALCITONIN SERPL-MCNC: 0.03 NG/ML — SIGNIFICANT CHANGE UP (ref 0.02–0.1)
PROT SERPL-MCNC: 7.7 G/DL — SIGNIFICANT CHANGE UP (ref 6–8.3)
RBC # BLD: 4.49 M/UL — SIGNIFICANT CHANGE UP (ref 3.8–5.2)
RBC # FLD: 14.2 % — SIGNIFICANT CHANGE UP (ref 10.3–14.5)
SODIUM SERPL-SCNC: 139 MMOL/L — SIGNIFICANT CHANGE UP (ref 135–145)
WBC # BLD: 8.25 K/UL — SIGNIFICANT CHANGE UP (ref 3.8–10.5)
WBC # FLD AUTO: 8.25 K/UL — SIGNIFICANT CHANGE UP (ref 3.8–10.5)

## 2020-10-18 PROCEDURE — 99233 SBSQ HOSP IP/OBS HIGH 50: CPT | Mod: GC

## 2020-10-18 RX ADMIN — ENOXAPARIN SODIUM 40 MILLIGRAM(S): 100 INJECTION SUBCUTANEOUS at 11:16

## 2020-10-18 RX ADMIN — PANTOPRAZOLE SODIUM 40 MILLIGRAM(S): 20 TABLET, DELAYED RELEASE ORAL at 06:00

## 2020-10-18 RX ADMIN — Medication 6 MILLIGRAM(S): at 06:00

## 2020-10-18 RX ADMIN — REMDESIVIR 500 MILLIGRAM(S): 5 INJECTION INTRAVENOUS at 14:00

## 2020-10-18 NOTE — PROGRESS NOTE ADULT - SUBJECTIVE AND OBJECTIVE BOX
PGY-1 Progress Note discussed with attending    PAGER #: [-----] TILL 5:00 PM  PLEASE CONTACT ON CALL TEAM:  - On Call Team (Please refer to Palak) FROM 5:00 PM - 8:30PM  - Nightfloat Team FROM 8:30 -7:30 AM    CHIEF COMPLAINT & BRIEF HOSPITAL COURSE:    49yo F from home unemployed lives with her mom with no significant PMH presented with cough and SOB . Onset of symptoms was almost 14 days ago started with mild headache , chills associated with poor appetite.  patient reports symptoms were progressive with worsening cough , sob for which patient visited PCP who prescribed Z pack and patient completed the course . Patient started to spike fever to Max 100.8 over night , with chest discomfort. patient endorses loose BM , poor oral intake due to poor appetite, denies N/V. Patient is unemployed and barely leaves her house , denies sick contact however mother presented to ED with similiar Sx although much milder. Patient reports symports started with her and mother afterwards.     INTERVAL HPI/OVERNIGHT EVENTS: Patient placed herself on NC when she feels shortness of breath    MEDICATIONS:  acetaminophen   Tablet .. 650 milliGRAM(s) Oral every 6 hours PRN  dexAMETHasone  Injectable 6 milliGRAM(s) IV Push daily  enoxaparin Injectable 40 milliGRAM(s) SubCutaneous daily  pantoprazole    Tablet 40 milliGRAM(s) Oral before breakfast  remdesivir  IVPB   IV Intermittent   remdesivir  IVPB 100 milliGRAM(s) IV Intermittent every 24 hours      REVIEW OF SYSTEMS:  CONSTITUTIONAL: No fever, weight loss, or fatigue  RESPIRATORY: No cough, wheezing, chills or hemoptysis; No shortness of breath  CARDIOVASCULAR: No chest pain, palpitations, dizziness, or leg swelling  GASTROINTESTINAL: No abdominal pain. No nausea, vomiting, or hematemesis; No diarrhea or constipation. No melena or hematochezia.  GENITOURINARY: No dysuria or hematuria, urinary frequency  NEUROLOGICAL: No headaches, memory loss, loss of strength, numbness, or tremors  SKIN: No itching, burning, rashes, or lesions     Vital Signs Last 24 Hrs  T(C): 36.8 (17 Oct 2020 21:11), Max: 37.2 (17 Oct 2020 06:33)  T(F): 98.3 (17 Oct 2020 21:11), Max: 98.9 (17 Oct 2020 06:33)  HR: 79 (17 Oct 2020 21:11) (73 - 79)  BP: 107/64 (17 Oct 2020 21:11) (105/48 - 112/48)  BP(mean): --  RR: 18 (17 Oct 2020 21:11) (18 - 20)  SpO2: 98% (17 Oct 2020 21:11) (96% - 100%)    PHYSICAL EXAMINATION:  GENERAL: Anxious, well-groomed, well-developed  HEAD:  Atraumatic, Normocephalic  EYES:  conjunctiva and sclera clear  NERVOUS SYSTEM:  Alert & Oriented X3, Good concentration; Moving all extremities  CHEST/LUNG: Cannot listen fully as pt has cough whenever she tries to take a deep breath  HEART: Regular rate and rhythm; No murmurs, rubs, or gallops  ABDOMEN: Soft, Nontender, Nondistended; Bowel sounds present  EXTREMITIES:  No edema                            12.1   10.93 )-----------( 205      ( 17 Oct 2020 07:26 )             35.5     10-17    141  |  108  |  16  ----------------------------<  113<H>  4.4   |  27  |  0.62    Ca    8.5      17 Oct 2020 07:26    TPro  7.4  /  Alb  2.6<L>  /  TBili  0.2  /  DBili  <0.1  /  AST  32  /  ALT  24  /  AlkPhos  55  10-17    LIVER FUNCTIONS - ( 17 Oct 2020 07:26 )  Alb: 2.6 g/dL / Pro: 7.4 g/dL / ALK PHOS: 55 U/L / ALT: 24 U/L DA / AST: 32 U/L / GGT: x                   CAPILLARY BLOOD GLUCOSE      RADIOLOGY & ADDITIONAL TESTS:                   PGY-1 Progress Note discussed with attending    PAGER #: [-----] TILL 5:00 PM  PLEASE CONTACT ON CALL TEAM:  - On Call Team (Please refer to Palak) FROM 5:00 PM - 8:30PM  - Nightfloat Team FROM 8:30 -7:30 AM    CHIEF COMPLAINT & BRIEF HOSPITAL COURSE:    51yo F from home unemployed lives with her mom with no significant PMH presented with cough and SOB . Onset of symptoms was almost 14 days ago started with mild headache , chills associated with poor appetite.  patient reports symptoms were progressive with worsening cough , sob for which patient visited PCP who prescribed Z pack and patient completed the course . Patient started to spike fever to Max 100.8 over night , with chest discomfort. patient endorses loose BM , poor oral intake due to poor appetite, denies N/V. Patient is unemployed and barely leaves her house , denies sick contact however mother presented to ED with similiar Sx although much milder. Patient reports symptoms started with her and mother afterwards.     INTERVAL HPI/OVERNIGHT EVENTS: Patient placed herself on NC when she feels shortness of breath    MEDICATIONS:  acetaminophen   Tablet .. 650 milliGRAM(s) Oral every 6 hours PRN  dexAMETHasone  Injectable 6 milliGRAM(s) IV Push daily  enoxaparin Injectable 40 milliGRAM(s) SubCutaneous daily  pantoprazole    Tablet 40 milliGRAM(s) Oral before breakfast  remdesivir  IVPB   IV Intermittent   remdesivir  IVPB 100 milliGRAM(s) IV Intermittent every 24 hours      REVIEW OF SYSTEMS:  CONSTITUTIONAL: No fever, weight loss, or fatigue  RESPIRATORY: No cough, wheezing, chills or hemoptysis; No shortness of breath  CARDIOVASCULAR: No chest pain, palpitations, dizziness, or leg swelling  GASTROINTESTINAL: No abdominal pain. No nausea, vomiting, or hematemesis; No diarrhea or constipation. No melena or hematochezia.  GENITOURINARY: No dysuria or hematuria, urinary frequency  NEUROLOGICAL: No headaches, memory loss, loss of strength, numbness, or tremors  SKIN: No itching, burning, rashes, or lesions     Vital Signs Last 24 Hrs  T(C): 36.8 (17 Oct 2020 21:11), Max: 37.2 (17 Oct 2020 06:33)  T(F): 98.3 (17 Oct 2020 21:11), Max: 98.9 (17 Oct 2020 06:33)  HR: 79 (17 Oct 2020 21:11) (73 - 79)  BP: 107/64 (17 Oct 2020 21:11) (105/48 - 112/48)  BP(mean): --  RR: 18 (17 Oct 2020 21:11) (18 - 20)  SpO2: 98% (17 Oct 2020 21:11) (96% - 100%)    PHYSICAL EXAMINATION:  GENERAL: Anxious, well-groomed, well-developed  HEAD:  Atraumatic, Normocephalic  EYES:  conjunctiva and sclera clear  NERVOUS SYSTEM:  Alert & Oriented X3, Good concentration; Moving all extremities  CHEST/LUNG: Cannot listen fully as pt has cough whenever she tries to take a deep breath  HEART: Regular rate and rhythm; No murmurs, rubs, or gallops  ABDOMEN: Soft, Nontender, Nondistended; Bowel sounds present  EXTREMITIES:  No edema                            12.1   10.93 )-----------( 205      ( 17 Oct 2020 07:26 )             35.5     10-17    141  |  108  |  16  ----------------------------<  113<H>  4.4   |  27  |  0.62    Ca    8.5      17 Oct 2020 07:26    TPro  7.4  /  Alb  2.6<L>  /  TBili  0.2  /  DBili  <0.1  /  AST  32  /  ALT  24  /  AlkPhos  55  10-17    LIVER FUNCTIONS - ( 17 Oct 2020 07:26 )  Alb: 2.6 g/dL / Pro: 7.4 g/dL / ALK PHOS: 55 U/L / ALT: 24 U/L DA / AST: 32 U/L / GGT: x                   CAPILLARY BLOOD GLUCOSE      RADIOLOGY & ADDITIONAL TESTS:

## 2020-10-18 NOTE — PROGRESS NOTE ADULT - PROBLEM SELECTOR PLAN 2
-  as above  -  Remdesivir was started and will follow up LFTs every day.   -  Isolation Precautions  -  will consider starting dexamethasone 6mg qd for 10 days if sx progress -  as above  -  Remdesivir was started and follow up LFTs every day.   -  Isolation Precautions  -  will consider starting dexamethasone 6mg qd for 10 days if sx progress

## 2020-10-18 NOTE — PROGRESS NOTE ADULT - PROBLEM SELECTOR PLAN 1
-  p/w SOB, cough, fever, RR:25, [id number] on RA 90%, saturated high 80s on    walking , COVID- 19 PCR positive  - COVID-19 antibodies is negative.   -  Shortness of Breath due to COVID-19 Infection   -  WBC: inc to 11 K   -  CXR : BL pulmonary infiltration   - Contact and airborne isolation precaution   - LDH, Procalcitonin, ferritin EVERY 2 DAYS   - continue with oxygen supplementation; O2 98 on NC 3L   - Tylenol, Albuterol Inhaler, Robitussin PRN

## 2020-10-19 LAB
ALBUMIN SERPL ELPH-MCNC: 2.8 G/DL — LOW (ref 3.5–5)
ALBUMIN SERPL ELPH-MCNC: 2.8 G/DL — LOW (ref 3.5–5)
ALP SERPL-CCNC: 54 U/L — SIGNIFICANT CHANGE UP (ref 40–120)
ALP SERPL-CCNC: 55 U/L — SIGNIFICANT CHANGE UP (ref 40–120)
ALT FLD-CCNC: 23 U/L DA — SIGNIFICANT CHANGE UP (ref 10–60)
ALT FLD-CCNC: 23 U/L DA — SIGNIFICANT CHANGE UP (ref 10–60)
ANION GAP SERPL CALC-SCNC: 6 MMOL/L — SIGNIFICANT CHANGE UP (ref 5–17)
APPEARANCE UR: CLEAR — SIGNIFICANT CHANGE UP
AST SERPL-CCNC: 15 U/L — SIGNIFICANT CHANGE UP (ref 10–40)
AST SERPL-CCNC: 15 U/L — SIGNIFICANT CHANGE UP (ref 10–40)
BACTERIA # UR AUTO: ABNORMAL /HPF
BASOPHILS # BLD AUTO: 0 K/UL — SIGNIFICANT CHANGE UP (ref 0–0.2)
BASOPHILS NFR BLD AUTO: 0 % — SIGNIFICANT CHANGE UP (ref 0–2)
BILIRUB DIRECT SERPL-MCNC: 0.1 MG/DL — SIGNIFICANT CHANGE UP (ref 0–0.2)
BILIRUB INDIRECT FLD-MCNC: 0.2 MG/DL — SIGNIFICANT CHANGE UP (ref 0.2–1)
BILIRUB SERPL-MCNC: 0.3 MG/DL — SIGNIFICANT CHANGE UP (ref 0.2–1.2)
BILIRUB SERPL-MCNC: 0.3 MG/DL — SIGNIFICANT CHANGE UP (ref 0.2–1.2)
BILIRUB UR-MCNC: NEGATIVE — SIGNIFICANT CHANGE UP
BUN SERPL-MCNC: 25 MG/DL — HIGH (ref 7–18)
CALCIUM SERPL-MCNC: 8.9 MG/DL — SIGNIFICANT CHANGE UP (ref 8.4–10.5)
CHLORIDE SERPL-SCNC: 108 MMOL/L — SIGNIFICANT CHANGE UP (ref 96–108)
CO2 SERPL-SCNC: 28 MMOL/L — SIGNIFICANT CHANGE UP (ref 22–31)
COLOR SPEC: YELLOW — SIGNIFICANT CHANGE UP
CREAT SERPL-MCNC: 0.79 MG/DL — SIGNIFICANT CHANGE UP (ref 0.5–1.3)
CRP SERPL-MCNC: 2.74 MG/DL — HIGH (ref 0–0.4)
DIFF PNL FLD: ABNORMAL
EOSINOPHIL # BLD AUTO: 0 K/UL — SIGNIFICANT CHANGE UP (ref 0–0.5)
EOSINOPHIL NFR BLD AUTO: 0 % — SIGNIFICANT CHANGE UP (ref 0–6)
EPI CELLS # UR: SIGNIFICANT CHANGE UP /HPF
FERRITIN SERPL-MCNC: 592 NG/ML — HIGH (ref 15–150)
GLUCOSE SERPL-MCNC: 114 MG/DL — HIGH (ref 70–99)
GLUCOSE UR QL: NEGATIVE — SIGNIFICANT CHANGE UP
HCT VFR BLD CALC: 39.4 % — SIGNIFICANT CHANGE UP (ref 34.5–45)
HGB BLD-MCNC: 12.8 G/DL — SIGNIFICANT CHANGE UP (ref 11.5–15.5)
HYALINE CASTS # UR AUTO: ABNORMAL /LPF
KETONES UR-MCNC: NEGATIVE — SIGNIFICANT CHANGE UP
LDH SERPL L TO P-CCNC: 269 U/L — HIGH (ref 120–225)
LEUKOCYTE ESTERASE UR-ACNC: NEGATIVE — SIGNIFICANT CHANGE UP
LYMPHOCYTES # BLD AUTO: 1.99 K/UL — SIGNIFICANT CHANGE UP (ref 1–3.3)
LYMPHOCYTES # BLD AUTO: 21 % — SIGNIFICANT CHANGE UP (ref 13–44)
MANUAL SMEAR VERIFICATION: SIGNIFICANT CHANGE UP
MCHC RBC-ENTMCNC: 27.1 PG — SIGNIFICANT CHANGE UP (ref 27–34)
MCHC RBC-ENTMCNC: 32.5 GM/DL — SIGNIFICANT CHANGE UP (ref 32–36)
MCV RBC AUTO: 83.5 FL — SIGNIFICANT CHANGE UP (ref 80–100)
MONOCYTES # BLD AUTO: 0.38 K/UL — SIGNIFICANT CHANGE UP (ref 0–0.9)
MONOCYTES NFR BLD AUTO: 4 % — SIGNIFICANT CHANGE UP (ref 2–14)
NEUTROPHILS # BLD AUTO: 7 K/UL — SIGNIFICANT CHANGE UP (ref 1.8–7.4)
NEUTROPHILS NFR BLD AUTO: 74 % — SIGNIFICANT CHANGE UP (ref 43–77)
NITRITE UR-MCNC: NEGATIVE — SIGNIFICANT CHANGE UP
NRBC # BLD: 0 /100 — SIGNIFICANT CHANGE UP (ref 0–0)
NT-PROBNP SERPL-SCNC: 58 PG/ML — SIGNIFICANT CHANGE UP (ref 0–125)
PH UR: 6.5 — SIGNIFICANT CHANGE UP (ref 5–8)
PLAT MORPH BLD: NORMAL — SIGNIFICANT CHANGE UP
PLATELET # BLD AUTO: 348 K/UL — SIGNIFICANT CHANGE UP (ref 150–400)
POTASSIUM SERPL-MCNC: 4 MMOL/L — SIGNIFICANT CHANGE UP (ref 3.5–5.3)
POTASSIUM SERPL-SCNC: 4 MMOL/L — SIGNIFICANT CHANGE UP (ref 3.5–5.3)
PROT SERPL-MCNC: 7.5 G/DL — SIGNIFICANT CHANGE UP (ref 6–8.3)
PROT SERPL-MCNC: 7.6 G/DL — SIGNIFICANT CHANGE UP (ref 6–8.3)
PROT UR-MCNC: 30 MG/DL
RBC # BLD: 4.72 M/UL — SIGNIFICANT CHANGE UP (ref 3.8–5.2)
RBC # FLD: 14.4 % — SIGNIFICANT CHANGE UP (ref 10.3–14.5)
RBC BLD AUTO: NORMAL — SIGNIFICANT CHANGE UP
RBC CASTS # UR COMP ASSIST: ABNORMAL /HPF (ref 0–2)
SODIUM SERPL-SCNC: 142 MMOL/L — SIGNIFICANT CHANGE UP (ref 135–145)
SP GR SPEC: 1.01 — SIGNIFICANT CHANGE UP (ref 1.01–1.02)
UROBILINOGEN FLD QL: NEGATIVE — SIGNIFICANT CHANGE UP
VARIANT LYMPHS # BLD: 1 % — SIGNIFICANT CHANGE UP (ref 0–6)
WBC # BLD: 9.46 K/UL — SIGNIFICANT CHANGE UP (ref 3.8–10.5)
WBC # FLD AUTO: 9.46 K/UL — SIGNIFICANT CHANGE UP (ref 3.8–10.5)
WBC UR QL: SIGNIFICANT CHANGE UP /HPF (ref 0–5)

## 2020-10-19 PROCEDURE — 99232 SBSQ HOSP IP/OBS MODERATE 35: CPT | Mod: GC

## 2020-10-19 RX ADMIN — Medication 6 MILLIGRAM(S): at 05:13

## 2020-10-19 RX ADMIN — ENOXAPARIN SODIUM 40 MILLIGRAM(S): 100 INJECTION SUBCUTANEOUS at 14:00

## 2020-10-19 RX ADMIN — PANTOPRAZOLE SODIUM 40 MILLIGRAM(S): 20 TABLET, DELAYED RELEASE ORAL at 05:13

## 2020-10-19 RX ADMIN — REMDESIVIR 500 MILLIGRAM(S): 5 INJECTION INTRAVENOUS at 16:10

## 2020-10-19 NOTE — PROGRESS NOTE ADULT - SUBJECTIVE AND OBJECTIVE BOX
PGY-1 Progress Note discussed with attending    PAGER #: [[phone]] TILL 5:00 PM  PLEASE CONTACT ON CALL TEAM:   - On Call Team (Please refer to Palak) FROM 5:00 PM - 8:30PM  - Nightfloat Team FROM 8:30 -7:30 AM    CHIEF COMPLAINT & BRIEF HOSPITAL COURSE:      INTERVAL HPI/OVERNIGHT EVENTS:       REVIEW OF SYSTEMS:  CONSTITUTIONAL: No fever, weight loss, or fatigue  RESPIRATORY: No cough, wheezing, chills or hemoptysis; No shortness of breath  CARDIOVASCULAR: No chest pain, palpitations, dizziness, or leg swelling  GASTROINTESTINAL: No abdominal pain. No nausea, vomiting, or hematemesis; No diarrhea or constipation. No melena or hematochezia.  GENITOURINARY: No dysuria or hematuria, urinary frequency  NEUROLOGICAL: No headaches, memory loss, loss of strength, numbness, or tremors  SKIN: No itching, burning, rashes, or lesions     MEDICATIONS  (STANDING):  dexAMETHasone  Injectable 6 milliGRAM(s) IV Push daily  enoxaparin Injectable 40 milliGRAM(s) SubCutaneous daily  pantoprazole    Tablet 40 milliGRAM(s) Oral before breakfast  remdesivir  IVPB   IV Intermittent   remdesivir  IVPB 100 milliGRAM(s) IV Intermittent every 24 hours    MEDICATIONS  (PRN):  acetaminophen   Tablet .. 650 milliGRAM(s) Oral every 6 hours PRN Temp greater or equal to 38C (100.4F), Mild Pain (1 - 3)      Vital Signs Last 24 Hrs  T(C): 36.6 (19 Oct 2020 05:40), Max: 36.8 (18 Oct 2020 22:41)  T(F): 97.8 (19 Oct 2020 05:40), Max: 98.3 (18 Oct 2020 22:41)  HR: 55 (19 Oct 2020 06:31) (55 - 68)  BP: 98/52 (19 Oct 2020 06:31) (93/51 - 103/69)  BP(mean): --  RR: 18 (19 Oct 2020 05:40) (18 - 18)  SpO2: 97% (19 Oct 2020 05:40) (94% - 97%)    PHYSICAL EXAMINATION:  GENERAL: NAD, well built  HEAD:  Atraumatic, Normocephalic  EYES:  conjunctiva and sclera clear  NECK: Supple, No JVD, Normal thyroid  CHEST/LUNG: Clear to auscultation. Clear to percussion bilaterally; No rales, rhonchi, wheezing, or rubs  HEART: Regular rate and rhythm; No murmurs, rubs, or gallops  ABDOMEN: Soft, Nontender, Nondistended; Bowel sounds present  NERVOUS SYSTEM:  Alert & Oriented X3,    EXTREMITIES:  2+ Peripheral Pulses, No clubbing, cyanosis, or edema  SKIN: warm dry                          12.8   9.46  )-----------( 348      ( 19 Oct 2020 07:24 )             39.4     10-19    142  |  108  |  x   ----------------------------<  x   4.0   |  x   |  x     Ca    8.9      18 Oct 2020 06:49    TPro  7.7  /  Alb  2.7<L>  /  TBili  0.3  /  DBili  x   /  AST  38  /  ALT  24  /  AlkPhos  53  10-18    LIVER FUNCTIONS - ( 18 Oct 2020 06:49 )  Alb: 2.7 g/dL / Pro: 7.7 g/dL / ALK PHOS: 53 U/L / ALT: 24 U/L DA / AST: 38 U/L / GGT: x                       I&O's Summary        CAPILLARY BLOOD GLUCOSE  CAPILLARY BLOOD GLUCOSE        CAPILLARY BLOOD GLUCOSE          RADIOLOGY & ADDITIONAL TESTS:                   PGY-1 Progress Note discussed with attending    PAGER #: [[phone]] TILL 5:00 PM  PLEASE CONTACT ON CALL TEAM:   - On Call Team (Please refer to Palak) FROM 5:00 PM - 8:30PM  - Nightfloat Team FROM 8:30 -7:30 AM    CHIEF COMPLAINT & BRIEF HOSPITAL COURSE: 49yo F from home unemployed lives with her mom with no significant PMH presented with cough and SOB . Onset of symptoms was almost 14 days ago started with mild headache , chills associated with poor appetite.  patient reports symptoms were progressive with worsening cough , sob for which patient visited PCP who prescribed Z pack and patient completed the course . Patient started to spike fever to Max 100.8 over night , with chest discomfort. patient endorses loose BM , poor oral intake due to poor appetite, denies N/V. Patient is unemployed and barely leaves her house , denies sick contact however mother presented to ED with similar Sx although much milder. Patient reports symptoms started with her and mother afterwards. Patient was started on anticoagulation, remdesivir 10/16 and decadron 10/16.     INTERVAL HPI/OVERNIGHT EVENTS: Patient feels fatigued. Is ambulating around the room and out of bed to chair. Denies worsening SOB. On 2L NC. Denies any GI complaints.      REVIEW OF SYSTEMS:  CONSTITUTIONAL: No fever, weight loss, or fatigue  RESPIRATORY: + cough, + shortness of breath.  No wheezing, chills or hemoptysis;  CARDIOVASCULAR: No chest pain, palpitations, dizziness, or leg swelling  GASTROINTESTINAL: No abdominal pain. No nausea, vomiting, or hematemesis; No diarrhea or constipation. No melena or hematochezia.  GENITOURINARY: No dysuria or hematuria, urinary frequency  NEUROLOGICAL: No headaches, memory loss, loss of strength, numbness, or tremors  SKIN: No itching, burning, rashes, or lesions     MEDICATIONS  (STANDING):  dexAMETHasone  Injectable 6 milliGRAM(s) IV Push daily  enoxaparin Injectable 40 milliGRAM(s) SubCutaneous daily  pantoprazole    Tablet 40 milliGRAM(s) Oral before breakfast  remdesivir  IVPB   IV Intermittent   remdesivir  IVPB 100 milliGRAM(s) IV Intermittent every 24 hours    MEDICATIONS  (PRN):  acetaminophen   Tablet .. 650 milliGRAM(s) Oral every 6 hours PRN Temp greater or equal to 38C (100.4F), Mild Pain (1 - 3)      Vital Signs Last 24 Hrs  T(C): 36.6 (19 Oct 2020 05:40), Max: 36.8 (18 Oct 2020 22:41)  T(F): 97.8 (19 Oct 2020 05:40), Max: 98.3 (18 Oct 2020 22:41)  HR: 55 (19 Oct 2020 06:31) (55 - 68)  BP: 98/52 (19 Oct 2020 06:31) (93/51 - 103/69)  BP(mean): --  RR: 18 (19 Oct 2020 05:40) (18 - 18)  SpO2: 97% (19 Oct 2020 05:40) (94% - 97%)    PHYSICAL EXAMINATION:  GENERAL: NAD, obese  HEAD:  Atraumatic, Normocephalic  EYES:  conjunctiva and sclera clear  NECK: Supple, No JVD, Normal thyroid  CHEST/LUNG: CTAB; No rales, rhonchi, wheezing, or rubs  HEART: Regular rate and rhythm; No murmurs, rubs, or gallops  ABDOMEN: Soft, Nontender, Nondistended; Bowel sounds present  NERVOUS SYSTEM:  Alert & Oriented X3,   EXTREMITIES:  2+ Peripheral Pulses, No clubbing, cyanosis, or edema  SKIN: warm dry                          12.8   9.46  )-----------( 348      ( 19 Oct 2020 07:24 )             39.4     10-19    142  |  108  |  x   ----------------------------<  x   4.0   |  x   |  x     Ca    8.9      18 Oct 2020 06:49    TPro  7.7  /  Alb  2.7<L>  /  TBili  0.3  /  DBili  x   /  AST  38  /  ALT  24  /  AlkPhos  53  10-18    LIVER FUNCTIONS - ( 18 Oct 2020 06:49 )  Alb: 2.7 g/dL / Pro: 7.7 g/dL / ALK PHOS: 53 U/L / ALT: 24 U/L DA / AST: 38 U/L / GGT: x             I&O's Summary      RADIOLOGY & ADDITIONAL TESTS:  < from: Xray Chest 1 View- PORTABLE-Urgent (10.15.20 @ 20:49) >    EXAM:  XR CHEST PORTABLE URGENT 1V                            PROCEDURE DATE:  10/15/2020          INTERPRETATION:  CLINICAL INDICATION: 50 years  Female with SOB, hypoxia.    COMPARISON: None    AP view of the chest demonstrates crowding of the lung markings secondary to suboptimal and tentorial effort. Right midlung discoid atelectasis is present. There are trace bilateral pleural effusions.    The heart is enlarged. The mediastinum and kimberley cannot be assessed. There is no pneumothorax.    The osseous structures are unremarkable.    IMPRESSION:    Low lung volumes.    Right midlung discoid atelectasis. Trace bilateral pleural effusions.            ADAMS BURROWS M.D., ATTENDING RADIOLOGIST  This document has been electronically signed. Oct 16 2020  9:08AM    < end of copied text >

## 2020-10-19 NOTE — PROGRESS NOTE ADULT - PROBLEM SELECTOR PLAN 2
-  as above  -  Remdesivir was started and follow up LFTs every day.   -  Isolation Precautions  -  will consider starting dexamethasone 6mg qd for 10 days if sx progress -  as above  -  Isolation Precautions  -  c/w dexamethasone 6mg IV qd x 10 days (10/16 ->10/26)  -  Remdesivir (10/16 -> 10/20)   -  Lovenox 40 SC QD   -  follow COVID labs and  LFTs daily

## 2020-10-19 NOTE — PROGRESS NOTE ADULT - PROBLEM SELECTOR PLAN 1
-  p/w SOB, cough, fever, RR:25, [id number] on RA 90%, saturated high 80s on    walking , COVID- 19 PCR positive  - COVID-19 antibodies is negative.   -  Shortness of Breath due to COVID-19 Infection   -  WBC: inc to 11 K   -  CXR : BL pulmonary infiltration   - Contact and airborne isolation precaution   - LDH, Procalcitonin, ferritin EVERY 2 DAYS   - continue with oxygen supplementation; O2 98 on NC 3L   - Tylenol, Albuterol Inhaler, Robitussin PRN -  p/w SOB, cough, fever, RR:25, [id number] on RA 90%, saturated high 80s on    walking , COVID- 19 PCR positive  - COVID-19 antibodies is negative.   -  Shortness of Breath due to COVID-19 Infection   -  WBC: inc to 11 K   -  CXR : BL pulmonary infiltration   - Contact and airborne isolation precaution   - LDH, Procalcitonin, ferritin EVERY 2 DAYS   - continue with oxygen supplementation; O2 97 on NC 2L   - incentive yesenia  - encourage ambulation  - titrate down NC

## 2020-10-20 LAB
A-TUMOR NECROSIS FACT SERPL-MCNC: <1.7 PG/ML — SIGNIFICANT CHANGE UP
ALBUMIN SERPL ELPH-MCNC: 2.5 G/DL — LOW (ref 3.5–5)
ALBUMIN SERPL ELPH-MCNC: 2.6 G/DL — LOW (ref 3.5–5)
ALP SERPL-CCNC: 50 U/L — SIGNIFICANT CHANGE UP (ref 40–120)
ALP SERPL-CCNC: 51 U/L — SIGNIFICANT CHANGE UP (ref 40–120)
ALT FLD-CCNC: 19 U/L DA — SIGNIFICANT CHANGE UP (ref 10–60)
ALT FLD-CCNC: 22 U/L DA — SIGNIFICANT CHANGE UP (ref 10–60)
ANION GAP SERPL CALC-SCNC: 5 MMOL/L — SIGNIFICANT CHANGE UP (ref 5–17)
AST SERPL-CCNC: 10 U/L — SIGNIFICANT CHANGE UP (ref 10–40)
AST SERPL-CCNC: 12 U/L — SIGNIFICANT CHANGE UP (ref 10–40)
BASOPHILS # BLD AUTO: 0.02 K/UL — SIGNIFICANT CHANGE UP (ref 0–0.2)
BASOPHILS NFR BLD AUTO: 0.2 % — SIGNIFICANT CHANGE UP (ref 0–2)
BILIRUB DIRECT SERPL-MCNC: <0.1 MG/DL — SIGNIFICANT CHANGE UP (ref 0–0.2)
BILIRUB INDIRECT FLD-MCNC: >0.3 MG/DL — SIGNIFICANT CHANGE UP (ref 0.2–1)
BILIRUB SERPL-MCNC: 0.2 MG/DL — SIGNIFICANT CHANGE UP (ref 0.2–1.2)
BILIRUB SERPL-MCNC: 0.4 MG/DL — SIGNIFICANT CHANGE UP (ref 0.2–1.2)
BUN SERPL-MCNC: 25 MG/DL — HIGH (ref 7–18)
CALCIUM SERPL-MCNC: 8.9 MG/DL — SIGNIFICANT CHANGE UP (ref 8.4–10.5)
CHLORIDE SERPL-SCNC: 108 MMOL/L — SIGNIFICANT CHANGE UP (ref 96–108)
CO2 SERPL-SCNC: 28 MMOL/L — SIGNIFICANT CHANGE UP (ref 22–31)
CREAT SERPL-MCNC: 0.72 MG/DL — SIGNIFICANT CHANGE UP (ref 0.5–1.3)
CRP SERPL-MCNC: 1.58 MG/DL — HIGH (ref 0–0.4)
EOSINOPHIL # BLD AUTO: 0.01 K/UL — SIGNIFICANT CHANGE UP (ref 0–0.5)
EOSINOPHIL NFR BLD AUTO: 0.1 % — SIGNIFICANT CHANGE UP (ref 0–6)
FERRITIN SERPL-MCNC: 509 NG/ML — HIGH (ref 15–150)
GLUCOSE SERPL-MCNC: 94 MG/DL — SIGNIFICANT CHANGE UP (ref 70–99)
HCT VFR BLD CALC: 38.8 % — SIGNIFICANT CHANGE UP (ref 34.5–45)
HGB BLD-MCNC: 12.5 G/DL — SIGNIFICANT CHANGE UP (ref 11.5–15.5)
IL10 SERPL-MCNC: 10.7 PG/ML — HIGH
IL12 SERPL-MCNC: <1.9 PG/ML — SIGNIFICANT CHANGE UP
IL13 SERPL-MCNC: <1.7 PG/ML — SIGNIFICANT CHANGE UP
IL17A SERPL-MCNC: <1.4 PG/ML — SIGNIFICANT CHANGE UP
IL2 SERPL-MCNC: 392.9 PG/ML — SIGNIFICANT CHANGE UP (ref 175.3–858.2)
IL2 SERPL-MCNC: <2.1 PG/ML — SIGNIFICANT CHANGE UP
IL4 SERPL-MCNC: <2.2 PG/ML — SIGNIFICANT CHANGE UP
IL6 SERPL-MCNC: 3.7 PG/ML — HIGH
IL8 SERPL-MCNC: <3 PG/ML — SIGNIFICANT CHANGE UP
IMM GRANULOCYTES NFR BLD AUTO: 1.9 % — HIGH (ref 0–1.5)
INTERFERON GAMMA, BLOOD: <4.2 PG/ML — SIGNIFICANT CHANGE UP
INTERLEUKIN 1 BETA: <6.5 PG/ML — SIGNIFICANT CHANGE UP
INTERLEUKIN 5: <2.1 PG/ML — SIGNIFICANT CHANGE UP
LDH SERPL L TO P-CCNC: 235 U/L — HIGH (ref 120–225)
LYMPHOCYTES # BLD AUTO: 2.61 K/UL — SIGNIFICANT CHANGE UP (ref 1–3.3)
LYMPHOCYTES # BLD AUTO: 29.3 % — SIGNIFICANT CHANGE UP (ref 13–44)
MAGNESIUM SERPL-MCNC: 2.9 MG/DL — HIGH (ref 1.6–2.6)
MCHC RBC-ENTMCNC: 26.8 PG — LOW (ref 27–34)
MCHC RBC-ENTMCNC: 32.2 GM/DL — SIGNIFICANT CHANGE UP (ref 32–36)
MCV RBC AUTO: 83.3 FL — SIGNIFICANT CHANGE UP (ref 80–100)
MONOCYTES # BLD AUTO: 0.63 K/UL — SIGNIFICANT CHANGE UP (ref 0–0.9)
MONOCYTES NFR BLD AUTO: 7.1 % — SIGNIFICANT CHANGE UP (ref 2–14)
NEUTROPHILS # BLD AUTO: 5.47 K/UL — SIGNIFICANT CHANGE UP (ref 1.8–7.4)
NEUTROPHILS NFR BLD AUTO: 61.4 % — SIGNIFICANT CHANGE UP (ref 43–77)
NRBC # BLD: 0 /100 WBCS — SIGNIFICANT CHANGE UP (ref 0–0)
PHOSPHATE SERPL-MCNC: 2.9 MG/DL — SIGNIFICANT CHANGE UP (ref 2.5–4.5)
PLATELET # BLD AUTO: 365 K/UL — SIGNIFICANT CHANGE UP (ref 150–400)
POTASSIUM SERPL-MCNC: 4.1 MMOL/L — SIGNIFICANT CHANGE UP (ref 3.5–5.3)
POTASSIUM SERPL-SCNC: 4.1 MMOL/L — SIGNIFICANT CHANGE UP (ref 3.5–5.3)
PROT SERPL-MCNC: 6.8 G/DL — SIGNIFICANT CHANGE UP (ref 6–8.3)
PROT SERPL-MCNC: 6.9 G/DL — SIGNIFICANT CHANGE UP (ref 6–8.3)
RBC # BLD: 4.66 M/UL — SIGNIFICANT CHANGE UP (ref 3.8–5.2)
RBC # FLD: 14.3 % — SIGNIFICANT CHANGE UP (ref 10.3–14.5)
SODIUM SERPL-SCNC: 141 MMOL/L — SIGNIFICANT CHANGE UP (ref 135–145)
WBC # BLD: 8.91 K/UL — SIGNIFICANT CHANGE UP (ref 3.8–10.5)
WBC # FLD AUTO: 8.91 K/UL — SIGNIFICANT CHANGE UP (ref 3.8–10.5)

## 2020-10-20 PROCEDURE — 99232 SBSQ HOSP IP/OBS MODERATE 35: CPT | Mod: GC

## 2020-10-20 RX ADMIN — PANTOPRAZOLE SODIUM 40 MILLIGRAM(S): 20 TABLET, DELAYED RELEASE ORAL at 06:38

## 2020-10-20 RX ADMIN — REMDESIVIR 500 MILLIGRAM(S): 5 INJECTION INTRAVENOUS at 13:54

## 2020-10-20 RX ADMIN — Medication 6 MILLIGRAM(S): at 06:38

## 2020-10-20 RX ADMIN — ENOXAPARIN SODIUM 40 MILLIGRAM(S): 100 INJECTION SUBCUTANEOUS at 12:02

## 2020-10-20 NOTE — PROGRESS NOTE ADULT - PROBLEM SELECTOR PROBLEM 2
Pneumonia due to COVID-19 virus

## 2020-10-20 NOTE — PROGRESS NOTE ADULT - ATTENDING COMMENTS
Patient was seen and examined at bedside   Tachypneic to 18, mild SOB  Complains of cough    Vitals requiring 3LNC O2     P/E:   In mild respiratory distress, able to speak in full sentence but coughs intermittently  Lungs BL coarse breath sound with crepitations in lower zone   S1S2 WNL, no MRG   Abd soft, non tender   BL LE no calf tenderness or edema     Labs: Noted     A/P:   Acute hypoxic respiratory failure due to COVID 19 Pneumonia   - Oxygen supplementation as needed; keep saturation >92%  - Started on Remdesevir and Dexamethasone  -  monitor LFTs and CMP daily  - monitor inflammatory markers: ESR, CRP, Procalcitonin, LDH, Ferritin and D-dimer  - obtain Vitamin D level  - HbA1c   - Encourage prone positioning q6hr as tolerated  - Isolation precautions for COVID-19 per infection control protocol  - electrolyte repletion   - Lovenox 40mg qd for DVT ppx  - Low threshold for ICU consult
Patient is a 50y old  Female who presents with  PNA due to Covid     Patient was seen and examined at bedside   SOB has markedly improved, able to walk inside the room off oxygen for few mins, cough has improved   Complains of burning with urination      INTERVAL HPI/OVERNIGHT EVENTS:  T(C): 36.7 (10-18-20 @ 14:45), Max: 36.8 (10-17-20 @ 21:11)  HR: 68 (10-18-20 @ 14:45) (68 - 79)  BP: 103/69 (10-18-20 @ 14:45) (102/63 - 107/64)  RR: 18 (10-18-20 @ 14:45) (18 - 18)  SpO2: 94% (10-18-20 @ 14:45) (94% - 98%)  Wt(kg): --  I&O's Summary      REVIEW OF SYSTEMS: denies fever, chills, chest pain, abdominal pain, nausea, vomiting, diarrhea, constipation, dizziness    PHYSICAL EXAM:  GENERAL: NAD  NERVOUS SYSTEM:  Alert & Oriented X3, Good concentration; Motor Strength 5/5 B/L upper and lower extremities  CHEST/LUNG: BL coarse breath sound with scattered breath sound  HEART: Regular rate and rhythm; No murmurs, rubs, or gallops  ABDOMEN: Soft, Nontender, Nondistended; Bowel sounds present, no supra pubic tenderness   EXTREMITIES:  2+ Peripheral Pulses, No clubbing, cyanosis, or edema  SKIN: No rashes or lesions    LABS:                        12.4   8.25  )-----------( 283      ( 18 Oct 2020 06:49 )             36.6     139  |  107  |  24<H>  ----------------------------<  117<H>  4.9   |  27  |  0.69    A/P:  Acute hypoxic respiratory failure due to COVID 19 Pneumonia   Prediabetes     Plan:  - Oxygen supplementation as needed; keep saturation >92%, titrate down as tolerated; advised patient not to take off oxygen without being closely monitored  - Cont on Remdesevir and Dexamethasone  - monitor LFTs and CMP daily  - monitor inflammatory markers: ESR, CRP, Procalcitonin, LDH, Ferritin and D-dimer q2-3 days  - Supplement Vitamin D   - HbA1c noted  - Encourage prone positioning q6hr as tolerated  - Isolation precautions for COVID-19 per infection control protocol  - electrolyte repletion   - Lovenox 40mg qd for DVT ppx  - Low threshold for ICU consult  - send UA, for burning with urination
Patient was seen and examined at bedside   Reports SOB has improved   Tolerating room air   Denies any urinary symptoms    Vitals noted     P/E:   Obese, NAD  AAOx3, no focal deficit   Lungs: BL crepitations has markedly improved   S1S2 WNL, no MRG  Abd soft, non tender, BS present  No LE calf tenderness or edema     A/P:  Acute hypoxic respiratory failure due to COVID 19 Pneumonia   Prediabetes     Plan:  - Oxygen supplementation as needed; keep saturation >92%, titrate down as tolerated  - Cont on Remdesevir and Dexamethasone  - monitor LFTs and CMP daily  - monitor inflammatory markers: ESR, CRP, Procalcitonin, LDH, Ferritin and D-dimer q2-3 days  - Supplement Vitamin D   - HbA1c noted, diet and exercise counselling done  - Encourage prone positioning q6hr as tolerated  - Isolation precautions for COVID-19 per infection control protocol  - electrolyte repletion   - Lovenox 40mg qd for DVT ppx  - Low threshold for ICU consult
Patient seen and examined; Agree with PGY1 A/P above with editing as needed. My independent assessment, findings on exam, diagnosis and plan of care as listed below. Discussed with Dr. Peterson    Patient admitted with worsening shortness of breath and cough for 2 weeks, hypoxic needing supplemental oxygen and treated with Remdasavir for 5 days with clinical improvement    Patient is doing well; denies fever, chills, SOB at rest, palpitations, chest pain, nausea, vomiting, diarrhea, constipation, dizziness. does get tired after going to restroom.     Vital Signs Last 24 Hrs  T(C): 36.3 (20 Oct 2020 12:43), Max: 37 (19 Oct 2020 21:29)  T(F): 97.4 (20 Oct 2020 12:43), Max: 98.6 (19 Oct 2020 21:29)  HR: 63 (20 Oct 2020 16:07) (54 - 79)  BP: 92/53 (20 Oct 2020 16:07) (87/62 - 111/61)  RR: 18 (20 Oct 2020 16:07) (18 - 19)  SpO2: 96% (20 Oct 2020 16:07) (92% - 96%)    P/E:   Obese, NAD  AAOx3, no focal deficit   Lungs: BL fine rales at both bases;    S1S2 regular  Abd soft, obese, non tender, BS present  B/L LE no calf tenderness or edema     Labs:                        12.5   8.91  )-----------( 365      ( 20 Oct 2020 07:09 )             38.8   10-20    141  |  108  |  25<H>  ----------------------------<  94  4.1   |  28  |  0.72    Ca    8.9      20 Oct 2020 07:09  Phos  2.9     10-20  Mg     2.9     10-20    TPro  6.9  /  Alb  2.5<L>  /  TBili  0.2  /  DBili  <0.1  /  AST  12  /  ALT  22  /  AlkPhos  51  10-20    Ferritin, Serum (10.20.20 @ 09:58) Ferritin, Serum: 509 ng/mL   Ferritin, Serum (10.18.20 @ 11:35) Ferritin, Serum: 646 ng/mL   C-Reactive Protein, Serum (10.20.20 @ 09:58) C-Reactive Protein, Serum: 1.58 mg/dL   C-Reactive Protein, Serum (10.17.20 @ 11:16) C-Reactive Protein, Serum: 7.40 mg/dL  Lactate Dehydrogenase, Serum (10.20.20 @ 07:09)   Lactate Dehydrogenase, Serum: 235 U/L     A/P:  Acute hypoxic respiratory failure due to COVID 19 Pneumonia   Obesity  Prediabetes     Plan:  - Oxygen supplementation as needed; keep saturation >92%, titrate down as tolerated; Patient weaned off nasal cannula currently saturating well on RA about 95%; Patient advised to ambulate within room overnight tonight off O2  -Add Incentive spirometry  - Completing Remdesevir today; Continue Dexamethasone  -Labs stable; no need to repeat daily  -Inflammatory markers trended down; will get D-dimer; given patient was hypoxic on admission as well as given her Obesity is high risk for a VTE hence will discharge plan on prophylactic Xarelto for 30 days.   - Supplement Vitamin D daily 1000 units daily  - HbA1c prediabetic range; diet and exercise counselling; Exercise 30 mins daily at least five days a week once recovers from her current illness  - Isolation precautions for COVID-19 per infection control protocol  - Lovenox 40mg qd for DVT ppx while Inhouse    Discussed with patient findings and plan of care at length; discussed likely discharge home tomorrow if remain stable overnight off oxygen  Discussed with PGY1 Dr. Peterson and RN

## 2020-10-20 NOTE — PROGRESS NOTE ADULT - SUBJECTIVE AND OBJECTIVE BOX
PGY-1 Progress Note discussed with attending    PAGER #: [[phone]] TILL 5:00 PM  PLEASE CONTACT ON CALL TEAM:   - On Call Team (Please refer to Palak) FROM 5:00 PM - 8:30PM  - Nightfloat Team FROM 8:30 -7:30 AM    CHIEF COMPLAINT & BRIEF HOSPITAL COURSE:      INTERVAL HPI/OVERNIGHT EVENTS:       REVIEW OF SYSTEMS:  CONSTITUTIONAL: No fever, weight loss, or fatigue  RESPIRATORY: No cough, wheezing, chills or hemoptysis; No shortness of breath  CARDIOVASCULAR: No chest pain, palpitations, dizziness, or leg swelling  GASTROINTESTINAL: No abdominal pain. No nausea, vomiting, or hematemesis; No diarrhea or constipation. No melena or hematochezia.  GENITOURINARY: No dysuria or hematuria, urinary frequency  NEUROLOGICAL: No headaches, memory loss, loss of strength, numbness, or tremors  SKIN: No itching, burning, rashes, or lesions     MEDICATIONS  (STANDING):  dexAMETHasone  Injectable 6 milliGRAM(s) IV Push daily  enoxaparin Injectable 40 milliGRAM(s) SubCutaneous daily  pantoprazole    Tablet 40 milliGRAM(s) Oral before breakfast  remdesivir  IVPB   IV Intermittent   remdesivir  IVPB 100 milliGRAM(s) IV Intermittent every 24 hours    MEDICATIONS  (PRN):  acetaminophen   Tablet .. 650 milliGRAM(s) Oral every 6 hours PRN Temp greater or equal to 38C (100.4F), Mild Pain (1 - 3)      Vital Signs Last 24 Hrs  T(C): 36.4 (20 Oct 2020 06:00), Max: 37 (19 Oct 2020 15:16)  T(F): 97.6 (20 Oct 2020 06:00), Max: 98.6 (19 Oct 2020 15:16)  HR: 54 (20 Oct 2020 06:00) (54 - 63)  BP: 92/54 (20 Oct 2020 06:00) (87/62 - 106/50)  BP(mean): --  RR: 18 (20 Oct 2020 06:00) (18 - 18)  SpO2: 96% (20 Oct 2020 06:00) (95% - 96%)    PHYSICAL EXAMINATION:  GENERAL: NAD, well built  HEAD:  Atraumatic, Normocephalic  EYES:  conjunctiva and sclera clear  NECK: Supple, No JVD, Normal thyroid  CHEST/LUNG: Clear to auscultation. Clear to percussion bilaterally; No rales, rhonchi, wheezing, or rubs  HEART: Regular rate and rhythm; No murmurs, rubs, or gallops  ABDOMEN: Soft, Nontender, Nondistended; Bowel sounds present  NERVOUS SYSTEM:  Alert & Oriented X3,    EXTREMITIES:  2+ Peripheral Pulses, No clubbing, cyanosis, or edema  SKIN: warm dry                          12.8   9.46  )-----------( 348      ( 19 Oct 2020 07:24 )             39.4     10-19    142  |  108  |  25<H>  ----------------------------<  114<H>  4.0   |  28  |  0.79    Ca    8.9      19 Oct 2020 07:24    TPro  7.5  /  Alb  2.8<L>  /  TBili  0.3  /  DBili  0.1  /  AST  15  /  ALT  23  /  AlkPhos  54  10-19    LIVER FUNCTIONS - ( 19 Oct 2020 07:24 )  Alb: 2.8 g/dL / Pro: 7.5 g/dL / ALK PHOS: 54 U/L / ALT: 23 U/L DA / AST: 15 U/L / GGT: x                       I&O's Summary        CAPILLARY BLOOD GLUCOSE  CAPILLARY BLOOD GLUCOSE        CAPILLARY BLOOD GLUCOSE          RADIOLOGY & ADDITIONAL TESTS:                   PGY-1 Progress Note discussed with attending    PAGER #: [[phone]] TILL 5:00 PM  PLEASE CONTACT ON CALL TEAM:   - On Call Team (Please refer to Palak) FROM 5:00 PM - 8:30PM  - Nightfloat Team FROM 8:30 -7:30 AM    CHIEF COMPLAINT & BRIEF HOSPITAL COURSE:  49yo F from home unemployed lives with her mom with no significant PMH presented with cough and SOB . Onset of symptoms was almost 14 days ago started with mild headache , chills associated with poor appetite.  patient reports symptoms were progressive with worsening cough , sob for which patient visited PCP who prescribed Z pack and patient completed the course . Patient started to spike fever to Max 100.8 over night , with chest discomfort. Patient endorses loose BM , poor oral intake due to poor appetite, denies N/V. Patient is unemployed and barely leaves her house , denies sick contact however mother presented to ED with similar Sx although much milder. Patient reports symptoms started with her and mother afterwards. Patient was started on anticoagulation, Remdesivir 10/16 - 10/20 and decadron 10/16.     INTERVAL HPI/OVERNIGHT EVENTS: Patient O2 requirements improved, tolerating room air. Above 90% O2 sat on walk test and at rest. No new complaints. Completed course of Remdesivir today 10/20.       REVIEW OF SYSTEMS:  CONSTITUTIONAL: No fever, weight loss, + fatigue  RESPIRATORY: + cough, wheezing, chills or hemoptysis; + shortness of breath  CARDIOVASCULAR: No chest pain, palpitations, dizziness, or leg swelling  GASTROINTESTINAL: No abdominal pain. No nausea, vomiting, or hematemesis; No diarrhea or constipation. No melena or hematochezia.  GENITOURINARY: No dysuria or hematuria, urinary frequency  NEUROLOGICAL: No headaches, memory loss, loss of strength, numbness, or tremors  SKIN: No itching, burning, rashes, or lesions   ********    MEDICATIONS  (STANDING):  dexAMETHasone  Injectable 6 milliGRAM(s) IV Push daily  enoxaparin Injectable 40 milliGRAM(s) SubCutaneous daily  pantoprazole    Tablet 40 milliGRAM(s) Oral before breakfast  remdesivir  IVPB   IV Intermittent   remdesivir  IVPB 100 milliGRAM(s) IV Intermittent every 24 hours    MEDICATIONS  (PRN):  acetaminophen   Tablet .. 650 milliGRAM(s) Oral every 6 hours PRN Temp greater or equal to 38C (100.4F), Mild Pain (1 - 3)      Vital Signs Last 24 Hrs  T(C): 36.4 (20 Oct 2020 06:00), Max: 37 (19 Oct 2020 15:16)  T(F): 97.6 (20 Oct 2020 06:00), Max: 98.6 (19 Oct 2020 15:16)  HR: 54 (20 Oct 2020 06:00) (54 - 63)  BP: 92/54 (20 Oct 2020 06:00) (87/62 - 106/50)  BP(mean): --  RR: 18 (20 Oct 2020 06:00) (18 - 18)  SpO2: 96% (20 Oct 2020 06:00) (95% - 96%)    PHYSICAL EXAMINATION:  GENERAL: NAD, well built  HEAD:  Atraumatic, Normocephalic  EYES:  conjunctiva and sclera clear  NECK: Supple, No JVD, Normal thyroid  CHEST/LUNG: Clear to auscultation. Clear to percussion bilaterally; No rales, rhonchi, wheezing, or rubs  HEART: Regular rate and rhythm; No murmurs, rubs, or gallops  ABDOMEN: Soft, Nontender, Nondistended; Bowel sounds present  NERVOUS SYSTEM:  Alert & Oriented X3,    EXTREMITIES:  2+ Peripheral Pulses, No clubbing, cyanosis, or edema  SKIN: warm dry                          12.8   9.46  )-----------( 348      ( 19 Oct 2020 07:24 )             39.4     10-19    142  |  108  |  25<H>  ----------------------------<  114<H>  4.0   |  28  |  0.79    Ca    8.9      19 Oct 2020 07:24    TPro  7.5  /  Alb  2.8<L>  /  TBili  0.3  /  DBili  0.1  /  AST  15  /  ALT  23  /  AlkPhos  54  10-19    LIVER FUNCTIONS - ( 19 Oct 2020 07:24 )  Alb: 2.8 g/dL / Pro: 7.5 g/dL / ALK PHOS: 54 U/L / ALT: 23 U/L DA / AST: 15 U/L / GGT: x                       I&O's Summary        CAPILLARY BLOOD GLUCOSE  CAPILLARY BLOOD GLUCOSE        CAPILLARY BLOOD GLUCOSE          RADIOLOGY & ADDITIONAL TESTS:                   PGY-1 Progress Note discussed with attending    PAGER #: [[phone]] TILL 5:00 PM  PLEASE CONTACT ON CALL TEAM:   - On Call Team (Please refer to [place]) FROM 5:00 PM - 8:30PM  - Nightfloat Team FROM 8:30 -7:30 AM    CHIEF COMPLAINT & BRIEF HOSPITAL COURSE:  49yo F from home unemployed lives with her mom with no significant PMH presented with cough and SOB . Onset of symptoms was almost 14 days ago started with mild headache , chills associated with poor appetite.  patient reports symptoms were progressive with worsening cough , sob for which patient visited PCP who prescribed Z pack and patient completed the course . Patient started to spike fever to Max 100.8 over night , with chest discomfort. Patient endorses loose BM , poor oral intake due to poor appetite, denies N/V. Patient is unemployed and barely leaves her house , denies sick contact however mother presented to ED with similar Sx although much milder. Patient reports symptoms started with her and mother afterwards. Patient was started on anticoagulation, Remdesivir 10/16 - 10/20 and decadron 10/16.     INTERVAL HPI/OVERNIGHT EVENTS: Patient O2 requirements improved, tolerating room air. Above 90% O2 sat on walk test and at rest. No new complaints. Completed course of Remdesivir today 10/20. Encouraging patient to ambulate around room.        REVIEW OF SYSTEMS:  CONSTITUTIONAL: No fever, weight loss, + fatigue  RESPIRATORY: + cough, wheezing, chills or hemoptysis; + shortness of breath  CARDIOVASCULAR: No chest pain, palpitations, dizziness, or leg swelling  GASTROINTESTINAL: No abdominal pain. No nausea, vomiting, or hematemesis; No diarrhea or constipation. No melena or hematochezia.  GENITOURINARY: No dysuria or hematuria, urinary frequency  NEUROLOGICAL: No headaches, memory loss, loss of strength, numbness, or tremors  SKIN: No itching, burning, rashes, or lesions     MEDICATIONS  (STANDING):  dexAMETHasone  Injectable 6 milliGRAM(s) IV Push daily  enoxaparin Injectable 40 milliGRAM(s) SubCutaneous daily  pantoprazole    Tablet 40 milliGRAM(s) Oral before breakfast  remdesivir  IVPB   IV Intermittent   remdesivir  IVPB 100 milliGRAM(s) IV Intermittent every 24 hours    MEDICATIONS  (PRN):  acetaminophen   Tablet .. 650 milliGRAM(s) Oral every 6 hours PRN Temp greater or equal to 38C (100.4F), Mild Pain (1 - 3)    Vital Signs Last 24 Hrs  T(C): 36.4 (20 Oct 2020 06:00), Max: 37 (19 Oct 2020 15:16)  T(F): 97.6 (20 Oct 2020 06:00), Max: 98.6 (19 Oct 2020 15:16)  HR: 54 (20 Oct 2020 06:00) (54 - 63)  BP: 92/54 (20 Oct 2020 06:00) (87/62 - 106/50)  BP(mean): --  RR: 18 (20 Oct 2020 06:00) (18 - 18)  SpO2: 96% (20 Oct 2020 06:00) (95% - 96%)    PHYSICAL EXAMINATION:  GENERAL: NAD, obese  HEAD:  Atraumatic, Normocephalic  EYES:  conjunctiva and sclera clear  NECK: Supple, No JVD, Normal thyroid  CHEST/LUNG: CTAB; No rales, rhonchi, wheezing, or rubs  HEART: Regular rate and rhythm; No murmurs, rubs, or gallops  ABDOMEN: Soft, Nontender, Nondistended; Bowel sounds present  NERVOUS SYSTEM:  Alert & Oriented X3,   EXTREMITIES:  2+ Peripheral Pulses, No clubbing, cyanosis, or edema  SKIN: warm dry                          12.8   9.46  )-----------( 348      ( 19 Oct 2020 07:24 )             39.4     10-19    142  |  108  |  25<H>  ----------------------------<  114<H>  4.0   |  28  |  0.79    Ca    8.9      19 Oct 2020 07:24    TPro  7.5  /  Alb  2.8<L>  /  TBili  0.3  /  DBili  0.1  /  AST  15  /  ALT  23  /  AlkPhos  54  10-19    LIVER FUNCTIONS - ( 19 Oct 2020 07:24 )  Alb: 2.8 g/dL / Pro: 7.5 g/dL / ALK PHOS: 54 U/L / ALT: 23 U/L DA / AST: 15 U/L / GGT: x               RADIOLOGY & ADDITIONAL TESTS:    < from: Xray Chest 1 View- PORTABLE-Urgent (10.15.20 @ 20:49) >    EXAM:  XR CHEST PORTABLE URGENT 1V                            PROCEDURE DATE:  10/15/2020          INTERPRETATION:  CLINICAL INDICATION: 50 years  Female with SOB, hypoxia.    COMPARISON: None    AP view of the chest demonstrates crowding of the lung markings secondary to suboptimal and tentorial effort. Right midlung discoid atelectasis is present. There are trace bilateral pleural effusions.    The heart is enlarged. The mediastinum and kimberley cannot be assessed. There is no pneumothorax.    The osseous structures are unremarkable.    IMPRESSION:    Low lung volumes.    Right midlung discoid atelectasis. Trace bilateral pleural effusions.            ADAMS BURROWS M.D., ATTENDING RADIOLOGIST  This document has been electronically signed. Oct 16 2020  9:08AM    < end of copied text >                 PGY-1 Progress Note discussed with attending    PAGER #: [[phone]] TILL 5:00 PM  PLEASE CONTACT ON CALL TEAM:   - On Call Team (Please refer to Palak) FROM 5:00 PM - 8:30PM  - Nightfloat Team FROM 8:30 -7:30 AM    CHIEF COMPLAINT & BRIEF HOSPITAL COURSE:  51yo F from home unemployed lives with her mom with no significant PMH presented with cough and SOB . Onset of symptoms was almost 14 days ago started with mild headache , chills associated with poor appetite.  patient reports symptoms were progressive with worsening cough , sob for which patient visited PCP who prescribed Z pack and patient completed the course . Patient started to spike fever to Max 100.8 over night , with chest discomfort. Patient endorses loose BM , poor oral intake due to poor appetite, denies N/V. Patient is unemployed and barely leaves her house , denies sick contact however mother presented to ED with similar Sx although much milder. Patient reports symptoms started with her and mother afterwards. Patient was started on anticoagulation, Remdesivir 10/16 - 10/20 and decadron 10/16.     INTERVAL HPI/OVERNIGHT EVENTS: Patient O2 requirements improved, tolerating room air. Above 90% O2 sat on walk test and at rest. No new complaints. Completed course of Remdesivir today 10/20. Encouraging patient to ambulate around room today and plan for D/C home tomorrow.        REVIEW OF SYSTEMS:  CONSTITUTIONAL: No fever, weight loss, + fatigue  RESPIRATORY: + cough, wheezing, chills or hemoptysis; + shortness of breath  CARDIOVASCULAR: No chest pain, palpitations, dizziness, or leg swelling  GASTROINTESTINAL: No abdominal pain. No nausea, vomiting, or hematemesis; No diarrhea or constipation. No melena or hematochezia.  GENITOURINARY: No dysuria or hematuria, urinary frequency  NEUROLOGICAL: No headaches, memory loss, loss of strength, numbness, or tremors  SKIN: No itching, burning, rashes, or lesions     MEDICATIONS  (STANDING):  dexAMETHasone  Injectable 6 milliGRAM(s) IV Push daily  enoxaparin Injectable 40 milliGRAM(s) SubCutaneous daily  pantoprazole    Tablet 40 milliGRAM(s) Oral before breakfast  remdesivir  IVPB   IV Intermittent   remdesivir  IVPB 100 milliGRAM(s) IV Intermittent every 24 hours    MEDICATIONS  (PRN):  acetaminophen   Tablet .. 650 milliGRAM(s) Oral every 6 hours PRN Temp greater or equal to 38C (100.4F), Mild Pain (1 - 3)    Vital Signs Last 24 Hrs  T(C): 36.4 (20 Oct 2020 06:00), Max: 37 (19 Oct 2020 15:16)  T(F): 97.6 (20 Oct 2020 06:00), Max: 98.6 (19 Oct 2020 15:16)  HR: 54 (20 Oct 2020 06:00) (54 - 63)  BP: 92/54 (20 Oct 2020 06:00) (87/62 - 106/50)  BP(mean): --  RR: 18 (20 Oct 2020 06:00) (18 - 18)  SpO2: 96% (20 Oct 2020 06:00) (95% - 96%)    PHYSICAL EXAMINATION:  GENERAL: NAD, obese  HEAD:  Atraumatic, Normocephalic  EYES:  conjunctiva and sclera clear  NECK: Supple, No JVD, Normal thyroid  CHEST/LUNG: CTAB; No rales, rhonchi, wheezing, or rubs  HEART: Regular rate and rhythm; No murmurs, rubs, or gallops  ABDOMEN: Soft, Nontender, Nondistended; Bowel sounds present  NERVOUS SYSTEM:  Alert & Oriented X3,   EXTREMITIES:  2+ Peripheral Pulses, No clubbing, cyanosis, or edema  SKIN: warm dry                          12.8   9.46  )-----------( 348      ( 19 Oct 2020 07:24 )             39.4     10-19    142  |  108  |  25<H>  ----------------------------<  114<H>  4.0   |  28  |  0.79    Ca    8.9      19 Oct 2020 07:24    TPro  7.5  /  Alb  2.8<L>  /  TBili  0.3  /  DBili  0.1  /  AST  15  /  ALT  23  /  AlkPhos  54  10-19    LIVER FUNCTIONS - ( 19 Oct 2020 07:24 )  Alb: 2.8 g/dL / Pro: 7.5 g/dL / ALK PHOS: 54 U/L / ALT: 23 U/L DA / AST: 15 U/L / GGT: x               RADIOLOGY & ADDITIONAL TESTS:    < from: Xray Chest 1 View- PORTABLE-Urgent (10.15.20 @ 20:49) >    EXAM:  XR CHEST PORTABLE URGENT 1V                            PROCEDURE DATE:  10/15/2020          INTERPRETATION:  CLINICAL INDICATION: 50 years  Female with SOB, hypoxia.    COMPARISON: None    AP view of the chest demonstrates crowding of the lung markings secondary to suboptimal and tentorial effort. Right midlung discoid atelectasis is present. There are trace bilateral pleural effusions.    The heart is enlarged. The mediastinum and kimberley cannot be assessed. There is no pneumothorax.    The osseous structures are unremarkable.    IMPRESSION:    Low lung volumes.    Right midlung discoid atelectasis. Trace bilateral pleural effusions.      ADAMS BURROWS M.D., ATTENDING RADIOLOGIST  This document has been electronically signed. Oct 16 2020  9:08AM    < end of copied text >

## 2020-10-20 NOTE — PROGRESS NOTE ADULT - PROBLEM SELECTOR PLAN 1
-  p/w SOB, cough, fever, RR:25, [id number] on RA 90%, saturated high 80s on    walking , COVID- 19 PCR positive  - COVID-19 antibodies is negative.   -  Shortness of Breath due to COVID-19 Infection   -  WBC: inc to 11 K   -  CXR : BL pulmonary infiltration   - Contact and airborne isolation precaution   - LDH, Procalcitonin, ferritin EVERY 2 DAYS   - continue with oxygen supplementation; O2 97 on NC 2L   - incentive yesneia  - encourage ambulation  - titrate down NC -  p/w SOB, cough, fever, RR:25, [id number] on RA 90%, saturated high 80s on    walking , COVID- 19 PCR positive  - COVID-19 antibodies is negative.   -  Shortness of Breath due to COVID-19 Infection   -  WBC: inc to 11 K   -  CXR : BL pulmonary infiltration   - Contact and airborne isolation precaution   - LDH, Procalcitonin, ferritin EVERY 2 DAYS   - continue with oxygen supplementation; O2 97 on NC 2L   - incentive yesenia  - encourage ambulation  - titrate down NC to RA  - O2 sat sitting :   - O2 sat walking : -  p/w SOB, cough, fever, RR:25, [id number] on RA 90%, saturated high 80s on    walking , COVID- 19 PCR positive  - COVID-19 antibodies is negative.   -  Shortness of Breath due to COVID-19 Infection   -  WBC: inc to 11 K   -  CXR : BL pulmonary infiltration   - Contact and airborne isolation precaution   - LDH, Procalcitonin, ferritin EVERY 2 DAYS   - continue with oxygen supplementation; O2 97 on NC 2L   - incentive yesenia  - encourage ambulation  - titrate down NC to RA  - O2 sat sitting RA 92%  - O2 sat walking RA 92%

## 2020-10-20 NOTE — PROGRESS NOTE ADULT - ASSESSMENT
49yo F from home unemployed lives with her mom with no significant PMH presented with cough and SOB . Onset of symptoms was almost 14 days ago started with mild headache , chills associated with poor appetite. Admitted for PNA due to COVID-19.
49yo F from home unemployed lives with her mom with no significant PMH presented with cough and SOB . Onset of symptoms was almost 14 days ago started with mild headache , chills associated with poor appetite. Admitted for PNA due to COVID-19.
51yo F from home unemployed lives with her mom with no significant PMH presented with cough and SOB . Onset of symptoms was almost 14 days ago started with mild headache , chills associated with poor appetite. Admitted for PNA due to COVID-19.
Acute hypoxic respiratory failure due to COVID 19 Pneumonia   - Oxygen supplementation as needed; keep saturation >92%, titrate down as tolerated  - Cont on Remdesevir and Dexamethasone  - monitor LFTs and CMP daily  - monitor inflammatory markers: ESR, CRP, Procalcitonin, LDH, Ferritin and D-dimer q2-3 days  - obtain Vitamin D level  - HbA1c   - Encourage prone positioning q6hr as tolerated  - Isolation precautions for COVID-19 per infection control protocol  - electrolyte repletion   - Lovenox 40mg qd for DVT ppx  - Low threshold for ICU consult
49yo F from home unemployed lives with her mom with no significant PMH presented with cough and SOB . Onset of symptoms was almost 14 days ago started with mild headache , chills associated with poor appetite. Admitted for PNA due to COVID-19.

## 2020-10-20 NOTE — PROGRESS NOTE ADULT - PROBLEM SELECTOR PLAN 2
-  as above  -  Isolation Precautions  -  c/w dexamethasone 6mg IV qd x 10 days (10/16 ->10/26)  -  Remdesivir (10/16 -> 10/20)   -  Lovenox 40 SC QD   -  follow COVID labs and  LFTs daily -  as above  -  Isolation Precautions  -  c/w dexamethasone 6mg IV qd x 10 days (10/16 ->10/26)  -  Remdesivir (10/16 -> 10/20) done  -  Lovenox 40 SC QD   -  follow COVID labs and  LFTs daily

## 2020-10-21 ENCOUNTER — TRANSCRIPTION ENCOUNTER (OUTPATIENT)
Age: 50
End: 2020-10-21

## 2020-10-21 VITALS
RESPIRATION RATE: 19 BRPM | OXYGEN SATURATION: 95 % | SYSTOLIC BLOOD PRESSURE: 106 MMHG | DIASTOLIC BLOOD PRESSURE: 57 MMHG | HEART RATE: 57 BPM | TEMPERATURE: 98 F

## 2020-10-21 LAB — D DIMER BLD IA.RAPID-MCNC: 152 NG/ML DDU — SIGNIFICANT CHANGE UP

## 2020-10-21 PROCEDURE — 80076 HEPATIC FUNCTION PANEL: CPT

## 2020-10-21 PROCEDURE — 71045 X-RAY EXAM CHEST 1 VIEW: CPT

## 2020-10-21 PROCEDURE — 83036 HEMOGLOBIN GLYCOSYLATED A1C: CPT

## 2020-10-21 PROCEDURE — 82784 ASSAY IGA/IGD/IGG/IGM EACH: CPT

## 2020-10-21 PROCEDURE — 83520 IMMUNOASSAY QUANT NOS NONAB: CPT

## 2020-10-21 PROCEDURE — 84484 ASSAY OF TROPONIN QUANT: CPT

## 2020-10-21 PROCEDURE — 36415 COLL VENOUS BLD VENIPUNCTURE: CPT

## 2020-10-21 PROCEDURE — 84443 ASSAY THYROID STIM HORMONE: CPT

## 2020-10-21 PROCEDURE — 86140 C-REACTIVE PROTEIN: CPT

## 2020-10-21 PROCEDURE — 80053 COMPREHEN METABOLIC PANEL: CPT

## 2020-10-21 PROCEDURE — 83880 ASSAY OF NATRIURETIC PEPTIDE: CPT

## 2020-10-21 PROCEDURE — 94640 AIRWAY INHALATION TREATMENT: CPT

## 2020-10-21 PROCEDURE — 99239 HOSP IP/OBS DSCHRG MGMT >30: CPT | Mod: GC

## 2020-10-21 PROCEDURE — 80061 LIPID PANEL: CPT

## 2020-10-21 PROCEDURE — 81001 URINALYSIS AUTO W/SCOPE: CPT

## 2020-10-21 PROCEDURE — 85652 RBC SED RATE AUTOMATED: CPT

## 2020-10-21 PROCEDURE — 85025 COMPLETE CBC W/AUTO DIFF WBC: CPT

## 2020-10-21 PROCEDURE — 84100 ASSAY OF PHOSPHORUS: CPT

## 2020-10-21 PROCEDURE — 82306 VITAMIN D 25 HYDROXY: CPT

## 2020-10-21 PROCEDURE — 82565 ASSAY OF CREATININE: CPT

## 2020-10-21 PROCEDURE — 82728 ASSAY OF FERRITIN: CPT

## 2020-10-21 PROCEDURE — 86769 SARS-COV-2 COVID-19 ANTIBODY: CPT

## 2020-10-21 PROCEDURE — 83615 LACTATE (LD) (LDH) ENZYME: CPT

## 2020-10-21 PROCEDURE — 99285 EMERGENCY DEPT VISIT HI MDM: CPT

## 2020-10-21 PROCEDURE — 93005 ELECTROCARDIOGRAM TRACING: CPT

## 2020-10-21 PROCEDURE — 84702 CHORIONIC GONADOTROPIN TEST: CPT

## 2020-10-21 PROCEDURE — 83735 ASSAY OF MAGNESIUM: CPT

## 2020-10-21 PROCEDURE — 85379 FIBRIN DEGRADATION QUANT: CPT

## 2020-10-21 PROCEDURE — 87635 SARS-COV-2 COVID-19 AMP PRB: CPT

## 2020-10-21 PROCEDURE — 84145 PROCALCITONIN (PCT): CPT

## 2020-10-21 RX ORDER — CHOLECALCIFEROL (VITAMIN D3) 125 MCG
1 CAPSULE ORAL
Qty: 30 | Refills: 0
Start: 2020-10-21 | End: 2020-11-19

## 2020-10-21 RX ORDER — RIVAROXABAN 15 MG-20MG
1 KIT ORAL
Qty: 30 | Refills: 0
Start: 2020-10-21 | End: 2020-11-19

## 2020-10-21 RX ADMIN — ENOXAPARIN SODIUM 40 MILLIGRAM(S): 100 INJECTION SUBCUTANEOUS at 11:35

## 2020-10-21 RX ADMIN — PANTOPRAZOLE SODIUM 40 MILLIGRAM(S): 20 TABLET, DELAYED RELEASE ORAL at 06:06

## 2020-10-21 RX ADMIN — Medication 6 MILLIGRAM(S): at 06:06

## 2020-10-21 NOTE — DISCHARGE NOTE PROVIDER - ATTENDING COMMENTS
Patient seen and examined 10/21/20 around 2 PM    Patient admitted with worsening shortness of breath and cough for 2 weeks, hypoxic needing supplemental oxygen and treated with Remdasavir for 5 days with clinical improvement    Patient is doing well; denies fever, chills, SOB at rest, palpitations, chest pain, nausea, vomiting, diarrhea, constipation, dizziness. saturating well with ambulation on RA    Vital Signs Last 24 Hrs  T(C): 36.5 (21 Oct 2020 15:54), Max: 36.5 (21 Oct 2020 15:54)  T(F): 97.7 (21 Oct 2020 15:54), Max: 97.7 (21 Oct 2020 15:54)  HR: 57 (21 Oct 2020 15:54) (57 - 57)  BP: 106/57 (21 Oct 2020 15:54) (106/57 - 106/57)  RR: 19 (21 Oct 2020 15:54) (19 - 19)  SpO2: 95% (21 Oct 2020 15:54) (93% - 95%)    P/E:   Obese, NAD  AAOx3, no focal deficit   Lungs: BL fine rales at both bases;    S1S2 regular  Abd soft, obese, non tender, BS present  B/L LE no calf tenderness or edema     Labs:                        12.5   8.91  )-----------( 365      ( 20 Oct 2020 07:09 )             38.8   10-20    141  |  108  |  25<H>  ----------------------------<  94  4.1   |  28  |  0.72    Ca    8.9      20 Oct 2020 07:09  Phos  2.9     10-20  Mg     2.9     10-20    TPro  6.9  /  Alb  2.5<L>  /  TBili  0.2  /  DBili  <0.1  /  AST  12  /  ALT  22  /  AlkPhos  51  10-20    A/P:  Acute hypoxic respiratory failure due to COVID 19 Pneumonia   Obesity  Prediabetes     Plan:    Patient clinically stable; D/C Home  Xarelto 10 mg daily for 30 days given risk factors arranged through [facility]  Patient requesdted a new PCP name; provided for Dr. Shanelle Bryant.   - Supplement Vitamin D daily 1000 units daily  - HbA1c prediabetic range; diet and exercise counselling; Exercise 30 mins daily at least five days a week once recovers from her current illness  - Isolation precautions for COVID-19 per infection control protocol    Discussed with patient findings and plan of care at length;

## 2020-10-21 NOTE — DIETITIAN INITIAL EVALUATION ADULT. - OTHER INFO
Patient from home lives with mother. Patient in isolation, visited pt. out of bed in chair, alert, reports po intake fair to poor past 2 wks, denies nausea/vomiting or diarrhea, stated loss weight  but unsure of amounts? dosing wt. 181.8 Lbs on 10/21/20 noted. Presently pt. reports improved appetite, consuming >50% of meals, observed lunch tray, encourage po intake, give pt. nutrition education & copies on My Plate Planner with carbohydrate counting (A1C-6.2%), reviewed & reinforced information provided, pt. receptive, d/w MD, skin intact. Patient from home lives with mother. Patient in isolation, visited pt. out of bed in chair, alert, reports po intake fair to poor past 2 wks, denies nausea/vomiting or diarrhea, stated loss weight  but unsure of amounts? dosing wt. 181.8 Lbs on 10/21/20 noted. Presently pt. reports improved appetite, consuming >50% of meals, observed lunch tray, encourage po intake, give pt. nutrition education & copies on My Plate Planner with carbohydrate counting (A1C-6.2%), reviewed & reinforced information provided, pt. receptive, encourage to follow up with PCP/MD once discharged, d/w MD, skin intact.

## 2020-10-21 NOTE — DIETITIAN INITIAL EVALUATION ADULT. - PROBLEM SELECTOR PLAN 1
p/w SOB, cough, fever, RR:25, [id number] on RA 90%, saturated high 80s on walking , COVID- 19 PCR positive    Shortness of Breath due to COVID-19 Infection   - WBC: WNL, lymphocyte; WNl   - CXR : BL pulmonary infiltration   - Contact and airborne isolation precaution   - LDH, Procalcitonin, ferritin   - continue with oxygen supplementation   - Tylenol, Albuterol Inhaler, Robitussin PRN

## 2020-10-21 NOTE — DISCHARGE NOTE PROVIDER - CARE PROVIDER_API CALL
JUAN JOSÉ MORALES  COLON/RECTAL SURGERY  [address] [place] [address]  Phone: [phone]  Fax: [phone]  Follow Up Time:

## 2020-10-21 NOTE — DIETITIAN INITIAL EVALUATION ADULT. - PERTINENT MEDS FT
MEDICATIONS  (STANDING):  dexAMETHasone  Injectable 6 milliGRAM(s) IV Push daily  enoxaparin Injectable 40 milliGRAM(s) SubCutaneous daily  pantoprazole    Tablet 40 milliGRAM(s) Oral before breakfast    MEDICATIONS  (PRN):  acetaminophen   Tablet .. 650 milliGRAM(s) Oral every 6 hours PRN Temp greater or equal to 38C (100.4F), Mild Pain (1 - 3)

## 2020-10-21 NOTE — DIETITIAN INITIAL EVALUATION ADULT. - PERTINENT LABORATORY DATA
10-20 Na141 mmol/L Glu 94 mg/dL K+ 4.1 mmol/L Cr  0.72 mg/dL BUN 25 mg/dL<H> 10-20 Phos 2.9 mg/dL 10-20 Alb 2.5 g/dL<L> 10-17 Chol 122 mg/dL LDL 54 mg/dL HDL 57 mg/dL Trig 55 mg/dL

## 2020-10-21 NOTE — DISCHARGE NOTE NURSING/CASE MANAGEMENT/SOCIAL WORK - PATIENT PORTAL LINK FT
You can access the FollowMyHealth Patient Portal offered by [facility] by registering at the following website: [email or web address]. By joining [facility]’s FollowMyHealth portal, you will also be able to view your health information using other applications (apps) compatible with our system.

## 2020-10-21 NOTE — PHARMACOTHERAPY INTERVENTION NOTE - COMMENTS
Patient was to be seen with Dorita, Pharmacy Student, for education on Xarelto. However, patient is in a contact-precaution room. Micromedex Carenotes for Xarelto were printed and provided to the patient's nurse, Tamiko, along with the medication to give to the patient. RN was instructed to call pharmacy, if the patient has any questions in regards to her medication.

## 2020-10-21 NOTE — DISCHARGE NOTE PROVIDER - NSDCCPCAREPLAN_GEN_ALL_CORE_FT
PRINCIPAL DISCHARGE DIAGNOSIS  Diagnosis: Pneumonia due to COVID-19 virus  Assessment and Plan of Treatment: you presented with  short of breath , cough and fever, RR:25, [id number] on room air was 90 %, your  COVID- 19 PCR is positive  - COVID-19 antibodies is negative.   -  Shortness of Breath due to COVID-19 Infection   -  WBC: inc to 11 K   -  CXR : BL pulmonary infiltration   - Contact and airborne isolation precaution   - LDH, Procalcitonin, ferritin EVERY 2 DAYS   - continue with oxygen supplementation; O2 98 on NC 3L   - Tylenol, Albuterol Inhaler, Robitussin PRN.   Problem/Plan - 2:  ·  Problem: Pneumonia due to COVID-19 virus.  Plan: -  as above  -  Remdesivir was started and follow up LFTs every day.   -  Isolation Precautions  -  will consider starting dexamethasone 6mg qd for 10 days if sx progress.          PRINCIPAL DISCHARGE DIAGNOSIS  Diagnosis: Pneumonia due to COVID-19 virus  Assessment and Plan of Treatment: you presented with  short of breath , cough and fever, RR:25, H4clnnsbgsyi on room air was 90 %, your  COVID- 19 PCR is positive.  your Chest xray showed  pulmonary infiltration .you were closely monitored. you finished a full course of Remdesivir . you were started on dexamethasone. you are recommended to continue on xarelta for 30 days after discharge . you are recommended to follow up with your primary physician within 1 week  after discharge.         PRINCIPAL DISCHARGE DIAGNOSIS  Diagnosis: Pneumonia due to COVID-19 virus  Assessment and Plan of Treatment: you presented with  short of breath , cough and fever, your respiratory  W6xlzibgcssu on room air was 90 %, your  COVID- 19 PCR is positive.  your Chest xray showed  pulmonary infiltration .you were closely monitored. you finished a full course of Remdesivir . you were started on dexamethasone. your Inflammatory markers trended down. you are recommended to continue on xarelta for 30 days after discharge . you are recommended to follow up with your primary physician within 1 week  after discharge.        SECONDARY DISCHARGE DIAGNOSES  Diagnosis: Prediabetes  Assessment and Plan of Treatment: You HBA1c is 6.2 .  your HbA1c prediabetic range; diet and exercise counselling; Exercise 30 mins daily at least five days a week once recovers from her current illness     PRINCIPAL DISCHARGE DIAGNOSIS  Diagnosis: Pneumonia due to COVID-19 virus  Assessment and Plan of Treatment: you presented with  short of breath , cough and fever, your respiratory  D4nhgjewbutx on room air was 90 %, your  COVID- 19 PCR is positive.  your Chest xray showed  pulmonary infiltration .you were closely monitored. you finished a full course of Remdesivir . you were started on dexamethasone. your Inflammatory markers trended down. you are recommended to continue on xarelta for 30 days after discharge . you are recommended to follow up with your primary physician within 1 week  after discharge.        SECONDARY DISCHARGE DIAGNOSES  Diagnosis: Prediabetes  Assessment and Plan of Treatment: You HBA1c is 6.2 .  your HbA1c prediabetic rang. you are recommended to  Exercise 30 mins daily at least five days a week once recovers from her current illness and to regulate your diet . you are recommended to start on prophylactic  vitamin D 1000 units daily and follow up with your physician within 1 week after discharge.     PRINCIPAL DISCHARGE DIAGNOSIS  Diagnosis: Pneumonia due to COVID-19 virus  Assessment and Plan of Treatment: you presented with  short of breath , cough and fever, your respiratory  C0qdgyuetjax on room air was 90 %, your  COVID- 19 PCR is positive.  your Chest xray showed  pulmonary infiltration .you were closely monitored. you finished a full course of Remdesivir . you were started on dexamethasone. your Inflammatory markers trended down. you are recommended to continue on xarelta for 30 days after discharge . you are recommended to follow up with your primary physician within 1 week  after discharge.        SECONDARY DISCHARGE DIAGNOSES  Diagnosis: 2019 novel coronavirus disease (COVID-19)  Assessment and Plan of Treatment: CORONAVIRUS INSTRUCTIONS:   Based on your current clinical status and stability, it has been determined that you no longer need hospitalization and can recover while remaining in self-quarantine at home. You should follow the prevention steps below until a healthcare provider or local or state health department says you can return to your normal activities.   1. You should restrict activities outside your home, except for getting medical care.   2. Do not go to work, school, or public areas.   3. Avoid using public transportation, ride-sharing, or taxis.   4. Separate yourself from other people and animals in your home as much as possible.  When you are around other people (e.g., sharing a room or vehicle) you should wear a facemask.  5. Wash your hands often with soap and water for at least 20 seconds, especially after blowing your nose, coughing, or sneezing; going to the bathroom; and before eating or preparing food.  6. Cover your mouth and nose with a tissue when you cough or sneeze. Throw used tissues in a lined trash can. Immediately wash your hands with soap and water for at least 20 seconds  7. High touch surfaces include counters, tabletops, doorknobs, bathroom fixtures, toilets, phones, keyboards, tablets, and bedside tables.  8. Avoid sharing dishes, drinking glasses, cups, eating utensils, towels, or bedding with other people or pets in your home. After using these items, they should be washed thoroughly with soap and water.  You are strongly advised to seek prompt medical attention if your illness worsens or you develop new symptoms like fever or difficulty breathing.      Diagnosis: Prediabetes  Assessment and Plan of Treatment: You HBA1c is 6.2 .  your HbA1c prediabetic rang. you are recommended to  Exercise 30 mins daily at least five days a week once recovers from her current illness and to regulate your diet . you are recommended to start on prophylactic  vitamin D 1000 units daily and follow up with your physician within 1 week after discharge.    Diagnosis: Vitamin D insufficiency  Assessment and Plan of Treatment: You were found to have low vitamin d levels. You are recommended to continue taking vitamin d supplements as prescribed. repeat levels in 3-4 weeks at primary care doctor office.     PRINCIPAL DISCHARGE DIAGNOSIS  Diagnosis: Pneumonia due to COVID-19 virus  Assessment and Plan of Treatment: you presented with  short of breath , cough and fever, your respiratory  E2vjkfgbersq on room air was 90 %, your  COVID- 19 PCR is positive.  your Chest xray showed  pulmonary infiltration .you were closely monitored. you finished a full course of Remdesivir . you were started on dexamethasone. your Inflammatory markers trended down. you are recommended to continue on xarelta for 30 days after discharge . you are recommended to follow up with your primary physician within 1 week  after discharge.        SECONDARY DISCHARGE DIAGNOSES  Diagnosis: Prediabetes  Assessment and Plan of Treatment: You HBA1c is 6.2 .  your HbA1c prediabetic rang. you are recommended to  Exercise 30 mins daily at least five days a week once recovers from her current illness and to regulate your diet . you are recommended to start on prophylactic  vitamin D 1000 units daily and follow up with your physician within 1 week after discharge.    Diagnosis: 2019 novel coronavirus disease (COVID-19)  Assessment and Plan of Treatment: CORONAVIRUS INSTRUCTIONS:   Based on your current clinical status and stability, it has been determined that you no longer need hospitalization and can recover while remaining in self-quarantine at home. You should follow the prevention steps below until a healthcare provider or local or state health department says you can return to your normal activities.   1. You should restrict activities outside your home, except for getting medical care.   2. Do not go to work, school, or public areas.   3. Avoid using public transportation, ride-sharing, or taxis.   4. Separate yourself from other people and animals in your home as much as possible.  When you are around other people (e.g., sharing a room or vehicle) you should wear a facemask.  5. Wash your hands often with soap and water for at least 20 seconds, especially after blowing your nose, coughing, or sneezing; going to the bathroom; and before eating or preparing food.  6. Cover your mouth and nose with a tissue when you cough or sneeze. Throw used tissues in a lined trash can. Immediately wash your hands with soap and water for at least 20 seconds  7. High touch surfaces include counters, tabletops, doorknobs, bathroom fixtures, toilets, phones, keyboards, tablets, and bedside tables.  8. Avoid sharing dishes, drinking glasses, cups, eating utensils, towels, or bedding with other people or pets in your home. After using these items, they should be washed thoroughly with soap and water.  You are strongly advised to seek prompt medical attention if your illness worsens or you develop new symptoms like fever or difficulty breathing.      Diagnosis: Vitamin D insufficiency  Assessment and Plan of Treatment: You were found to have low vitamin d levels. You are recommended to continue taking vitamin d supplements as prescribed. repeat levels in 3-4 weeks at primary care doctor office.     PRINCIPAL DISCHARGE DIAGNOSIS  Diagnosis: Pneumonia due to COVID-19 virus  Assessment and Plan of Treatment: you presented with  short of breath , cough and fever, your respiratory  T0mxjsvkkyao on room air was 90 %, your  COVID- 19 PCR is positive.  your Chest xray showed  pulmonary infiltration .you were closely monitored. you finished a full course of Remdesivir . you were started on dexamethasone. your Inflammatory markers trended down. you are recommended to continue on xarelta for 30 days after discharge given you are overweight and prediabetic with risk of developing clots . you are recommended to follow up with your primary physician within 1 week  after discharge.        SECONDARY DISCHARGE DIAGNOSES  Diagnosis: Prediabetes  Assessment and Plan of Treatment: You HBA1c is 6.2 .  your HbA1c prediabetic rang. you are recommended to  Exercise 30 mins daily at least five days a week once recovers from her current illness and to regulate your diet . you are recommended to start on prophylactic  vitamin D 1000 units daily and follow up with your physician within 1 week after discharge.    Diagnosis: 2019 novel coronavirus disease (COVID-19)  Assessment and Plan of Treatment: CORONAVIRUS INSTRUCTIONS:   Based on your current clinical status and stability, it has been determined that you no longer need hospitalization and can recover while remaining in self-quarantine at home. You should follow the prevention steps below until a healthcare provider or local or state health department says you can return to your normal activities.   1. You should restrict activities outside your home, except for getting medical care.   2. Do not go to work, school, or public areas.   3. Avoid using public transportation, ride-sharing, or taxis.   4. Separate yourself from other people and animals in your home as much as possible.  When you are around other people (e.g., sharing a room or vehicle) you should wear a facemask.  5. Wash your hands often with soap and water for at least 20 seconds, especially after blowing your nose, coughing, or sneezing; going to the bathroom; and before eating or preparing food.  6. Cover your mouth and nose with a tissue when you cough or sneeze. Throw used tissues in a lined trash can. Immediately wash your hands with soap and water for at least 20 seconds  7. High touch surfaces include counters, tabletops, doorknobs, bathroom fixtures, toilets, phones, keyboards, tablets, and bedside tables.  8. Avoid sharing dishes, drinking glasses, cups, eating utensils, towels, or bedding with other people or pets in your home. After using these items, they should be washed thoroughly with soap and water.  You are strongly advised to seek prompt medical attention if your illness worsens or you develop new symptoms like fever or difficulty breathing.      Diagnosis: Vitamin D insufficiency  Assessment and Plan of Treatment: You were found to have low vitamin d levels. You are recommended to continue taking vitamin d supplements as prescribed. repeat levels in 3-4 weeks at primary care doctor office.     PRINCIPAL DISCHARGE DIAGNOSIS  Diagnosis: Pneumonia due to COVID-19 virus  Assessment and Plan of Treatment: you presented with  short of breath , cough and fever, your respiratory  J9uybougwzsb on room air was 90 %, your  COVID- 19 PCR is positive.  your Chest xray showed  pulmonary infiltration .you were closely monitored. you finished a full course of Remdesivir . you were started on dexamethasone. your Inflammatory markers trended down. you are recommended to continue onblood thinners for 30 days after discharge given you are overweight and prediabetic with risk of developing clots and if you noticed any signs of bleeding in urine or stool ,stop the medication and call your primary physician .  you are recommended to follow up with your primary physician within 1 week  after discharge.        SECONDARY DISCHARGE DIAGNOSES  Diagnosis: Prediabetes  Assessment and Plan of Treatment: You HBA1c is 6.2 .  your HbA1c prediabetic rang. you are recommended to  Exercise 30 mins daily at least five days a week once recovers from her current illness and to regulate your diet . you are recommended to start on prophylactic  vitamin D 1000 units daily and follow up with your physician within 1 week after discharge.    Diagnosis: 2019 novel coronavirus disease (COVID-19)  Assessment and Plan of Treatment: CORONAVIRUS INSTRUCTIONS:   Based on your current clinical status and stability, it has been determined that you no longer need hospitalization and can recover while remaining in self-quarantine at home. You should follow the prevention steps below until a healthcare provider or local or state health department says you can return to your normal activities.   1. You should restrict activities outside your home, except for getting medical care.   2. Do not go to work, school, or public areas.   3. Avoid using public transportation, ride-sharing, or taxis.   4. Separate yourself from other people and animals in your home as much as possible.  When you are around other people (e.g., sharing a room or vehicle) you should wear a facemask.  5. Wash your hands often with soap and water for at least 20 seconds, especially after blowing your nose, coughing, or sneezing; going to the bathroom; and before eating or preparing food.  6. Cover your mouth and nose with a tissue when you cough or sneeze. Throw used tissues in a lined trash can. Immediately wash your hands with soap and water for at least 20 seconds  7. High touch surfaces include counters, tabletops, doorknobs, bathroom fixtures, toilets, phones, keyboards, tablets, and bedside tables.  8. Avoid sharing dishes, drinking glasses, cups, eating utensils, towels, or bedding with other people or pets in your home. After using these items, they should be washed thoroughly with soap and water.  You are strongly advised to seek prompt medical attention if your illness worsens or you develop new symptoms like fever or difficulty breathing.      Diagnosis: Vitamin D insufficiency  Assessment and Plan of Treatment: You were found to have low vitamin d levels. You are recommended to continue taking vitamin d supplements as prescribed. repeat levels in 3-4 weeks at primary care doctor office.     PRINCIPAL DISCHARGE DIAGNOSIS  Diagnosis: Acute respiratory failure with hypoxia  Assessment and Plan of Treatment: You presented to the hospital with worsening shortness of breath , cough and fever, your respiratory  O2 saturation on room air was found to be 90 % which was low. your  COVID- 19 PCR returned  positive.  Chest xray showed  some trace effusion.  .you were closely monitored.   You finished a full course of Remdesivir for 5 days. You were started on dexamethasone. your Inflammatory markers trended down. Although your D-dimers were within normal limits, you are recommended to continue on blood thinners for 30 days after discharge to prevent any blood clot  given you are overweight and prediabetic with risk of developing clots. If you noticed any signs of major bleeding in urine or stool ,stop the medication and call your primary physician .  you are recommended to follow up witha primary care physician within 1 week  after discharge. We have provided you information for a new PCP as your requested. Follow up with Dr. Shanelle Bryant at [address] [place] [address].      SECONDARY DISCHARGE DIAGNOSES  Diagnosis: Pneumonia due to COVID-19 virus  Assessment and Plan of Treatment: you presented with worsening  shortness of breath , cough and fever,   Oxygensaturation on room air was  noted to be low at 90 %, your  COVID- 19 PCR is positive.  You had likely clinically evidence of COVID Pneumonia. was treated with Supplemental oxygen, Remdasavir and Dexamethasone. Inflammatory markers trended down.  You have been tapered off oxygen support and has been able to ambulate well without any oxygen last 24 hrs.   Continue to maintain quarantine for one more week and wear a mask while in public places. See Coronavirus instructions below.       Diagnosis: 2019 novel coronavirus disease (COVID-19)  Assessment and Plan of Treatment: CORONAVIRUS INSTRUCTIONS:   Based on your current clinical status and stability, it has been determined that you no longer need hospitalization and can recover while remaining in self-quarantine at home. You should follow the prevention steps below until a healthcare provider or local or state health department says you can return to your normal activities.   1. You should restrict activities outside your home, except for getting medical care.   2. Do not go to work, school, or public areas.   3. Avoid using public transportation, ride-sharing, or taxis.   4. Separate yourself from other people and animals in your home as much as possible.  When you are around other people (e.g., sharing a room or vehicle) you should wear a facemask.  5. Wash your hands often with soap and water for at least 20 seconds, especially after blowing your nose, coughing, or sneezing; going to the bathroom; and before eating or preparing food.  6. Cover your mouth and nose with a tissue when you cough or sneeze. Throw used tissues in a lined trash can. Immediately wash your hands with soap and water for at least 20 seconds  7. High touch surfaces include counters, tabletops, doorknobs, bathroom fixtures, toilets, phones, keyboards, tablets, and bedside tables.  8. Avoid sharing dishes, drinking glasses, cups, eating utensils, towels, or bedding with other people or pets in your home. After using these items, they should be washed thoroughly with soap and water.  You are strongly advised to seek prompt medical attention if your illness worsens or you develop new symptoms like fever or difficulty breathing.      Diagnosis: Vitamin D insufficiency  Assessment and Plan of Treatment: You were found to have low vitamin d levels around 26 (mild deficiency) You are recommended to continue taking vitamin D supplements (over the counter) 1000 unitds daily as prescribed. repeat levels in 4 to 6 weeks at primary care doctor office.    Diagnosis: Prediabetes  Assessment and Plan of Treatment: You HBA1c is 6.2 .  your HbA1c prediabetic range. you are recommended to  Exercise 30 mins daily at least five days a week once recovers from her current illness and to regulate your diet . you are recommended to start on prophylactic  vitamin D 1000 units daily and follow up with your physician within 1 week after discharge.  You have been counseled by Dietitian on a low carbohydrate diet.   Diet, Exercise and weight loss can delay the progression of your Prediabetes to overt Diabetes.

## 2020-10-21 NOTE — DISCHARGE NOTE PROVIDER - HOSPITAL COURSE
51yo F from home unemployed lives with her mom with no significant PMH presented with cough and SOB . Onset of symptoms was almost 14 days before admission started with mild headache , chills associated with poor appetite.  patient reports symptoms were progressive with worsening cough , sob for which patient visited PCP who prescribed Z pack and patient completed the course . Patient started to spike fever to Max 100.8 over night , with chest discomfort. Patient endorses loose BM , poor oral intake due to poor appetite, denies N/V. Patient is unemployed and barely leaves her house , denies sick contact however mother presented to ED with similar Sx although much milder. Patient reports symptoms started with her and mother afterwards. Patient was started on anticoagulation, Remdesivir from  10/16 to 10/20 and decadron from  10/16. patient condition improved.     Given patient's improved clinical status and current hemodynamic stability, decision was made to discharge.

## 2020-10-21 NOTE — DISCHARGE NOTE PROVIDER - NSDCFUADDINST_GEN_ALL_CORE_FT
Follow up with new PCP as per information provided DR. JUAN JOSÉ MORALES AT [address] [place] [address] FOR CONTINUED CARE    Follow up with Screening Colonoscopy, Mammogram, Pap smear if not done recently.     Diabetic low fat diet, Exercise and Weight loss as advised.

## 2020-10-21 NOTE — DISCHARGE NOTE PROVIDER - NSDCMRMEDTOKEN_GEN_ALL_CORE_FT
Vitamin D3 1000 intl units (25 mcg) oral capsule: 1 cap(s) orally once a day   Xarelto 10 mg oral tablet: 1 tab(s) orally once a day

## 2020-10-25 ENCOUNTER — FORM ENCOUNTER (OUTPATIENT)
Age: 50
End: 2020-10-25

## 2020-10-26 ENCOUNTER — TRANSCRIPTION ENCOUNTER (OUTPATIENT)
Age: 50
End: 2020-10-26

## 2020-10-27 ENCOUNTER — APPOINTMENT (OUTPATIENT)
Dept: CARE COORDINATION | Facility: HOME HEALTH | Age: 50
End: 2020-10-27
Payer: COMMERCIAL

## 2020-10-27 ENCOUNTER — TRANSCRIPTION ENCOUNTER (OUTPATIENT)
Age: 50
End: 2020-10-27

## 2020-10-27 VITALS — HEART RATE: 95 BPM | OXYGEN SATURATION: 97 % | TEMPERATURE: 96.8 F

## 2020-10-27 DIAGNOSIS — J12.82 COVID-19: ICD-10-CM

## 2020-10-27 DIAGNOSIS — U07.1 COVID-19: ICD-10-CM

## 2020-10-27 PROBLEM — Z00.00 ENCOUNTER FOR PREVENTIVE HEALTH EXAMINATION: Status: ACTIVE | Noted: 2020-10-27

## 2020-10-27 PROCEDURE — 99024 POSTOP FOLLOW-UP VISIT: CPT

## 2020-10-27 RX ORDER — RIVAROXABAN 10 MG/1
10 TABLET, FILM COATED ORAL
Refills: 0 | Status: ACTIVE | COMMUNITY

## 2020-10-27 RX ORDER — MULTIVIT-MIN/FOLIC/VIT K/LYCOP 400-300MCG
25 MCG TABLET ORAL DAILY
Refills: 0 | Status: ACTIVE | COMMUNITY

## 2020-11-04 ENCOUNTER — APPOINTMENT (OUTPATIENT)
Dept: INTERNAL MEDICINE | Facility: CLINIC | Age: 50
End: 2020-11-04

## 2020-11-17 ENCOUNTER — TRANSCRIPTION ENCOUNTER (OUTPATIENT)
Age: 50
End: 2020-11-17

## 2020-11-19 ENCOUNTER — FORM ENCOUNTER (OUTPATIENT)
Age: 50
End: 2020-11-19

## 2020-12-10 NOTE — HISTORY OF PRESENT ILLNESS
[Home] : at home, [unfilled] , at the time of the visit. [Other Location: e.g. Home (Enter Location, City,State)___] : at [unfilled] [Verbal consent obtained from patient] : the patient, [unfilled] [de-identified] : Hospital Course: [facility] [place] \par Admission Date	15-Oct-2020 \par Discharge Date	21-Oct-2020\par Reason for Admission: PNA due t Covid\par Clinical history reviewed, copied and edited from SCM discharge note: \par This is 50 year old female from home unemployed lives with her mom with no significant PMH presented the hospital with cough, and SOB. Onset of 14 days prior to ED admission, started with mild headache, chills associated with poor appetite.  Patient reported symptoms were progressed with worsening cough, sob for which patient visited PCP who prescribed Z pack and patient completed the course. Patient started to spike fever to Max 100.8 overnight, with chest discomfort. Patient endorses loose BM, poor oral intake due to poor appetite. Patient’s mother presented to ED with similar Sx although much milder. Patient reports symptoms started with her and mother afterwards. Patient was started on anticoagulation, Remdesivir from 10/16 to 10/20 and Decadron from 10/16. Patient’s condition improved. Patient discharged home with home care services. Patient discharged home with thermometer and pulse ox device. Patient with no PCP\par \par Telehealth visit done today. Patient alert and oriented, verbalized feeling much better today. Pt c/o productive cough and some SOB at times improving.  She denies any fever/chills, headache, palpitations, chest pain, chest discomfort, abdominal pain, N/V or dysuria

## 2020-12-10 NOTE — PLAN
[FreeTextEntry1] : Pt was informed about CN’s role/ TCM program and overview of transitional care reviewed with patient.  Pt encouraged calling CN with any issues, concerns or questions. Reassurance provided. Will continue to monitor.\par \par Patient with f/u appt with PCP 11/4/2020.

## 2020-12-10 NOTE — REVIEW OF SYSTEMS
[Shortness Of Breath] : shortness of breath [Cough] : cough [Negative] : Neurological [Fever] : no fever [Chills] : no chills [Fatigue] : no fatigue [Discharge] : no discharge [Redness] : no redness [Vision Problems] : no vision problems [Earache] : no earache [Hearing Loss] : no hearing loss [Nosebleed] : no nosebleeds [Hoarseness] : no hoarseness [Nasal Discharge] : no nasal discharge [Sore Throat] : no sore throat [Chest Pain] : no chest pain [Palpitations] : no palpitations [Lower Ext Edema] : no lower extremity edema [Dyspnea on Exertion] : no dyspnea on exertion [Abdominal Pain] : no abdominal pain [Nausea] : no nausea [Constipation] : no constipation [Diarrhea] : diarrhea [Vomiting] : no vomiting [Dysuria] : no dysuria [Incontinence] : no incontinence [Frequency] : no frequency [Joint Pain] : no joint pain [Muscle Pain] : no muscle pain [FreeTextEntry6] : some SOB at times/ productive cough improving

## 2021-10-06 PROBLEM — U07.1 PNEUMONIA DUE TO COVID-19 VIRUS: Status: ACTIVE | Noted: 2020-10-27

## 2022-07-29 NOTE — DIETITIAN INITIAL EVALUATION ADULT. - ENTER TO (G/KG)
Fariba Sharma,    Attached are your test results.  Negative for Sickle Cell    Please contact us if you have any questions.    Orly Kumari, CNP     1.4